# Patient Record
Sex: FEMALE | Race: WHITE | Employment: OTHER | URBAN - METROPOLITAN AREA
[De-identification: names, ages, dates, MRNs, and addresses within clinical notes are randomized per-mention and may not be internally consistent; named-entity substitution may affect disease eponyms.]

---

## 2018-01-01 ENCOUNTER — APPOINTMENT (OUTPATIENT)
Dept: LAB | Facility: HOSPITAL | Age: 73
End: 2018-01-01
Payer: COMMERCIAL

## 2018-01-01 ENCOUNTER — TRANSCRIBE ORDERS (OUTPATIENT)
Dept: ADMINISTRATIVE | Facility: HOSPITAL | Age: 73
End: 2018-01-01

## 2018-01-01 ENCOUNTER — TRANSCRIBE ORDERS (OUTPATIENT)
Dept: LAB | Facility: CLINIC | Age: 73
End: 2018-01-01

## 2018-01-01 DIAGNOSIS — E11.9 DIABETES MELLITUS WITHOUT COMPLICATION (HCC): ICD-10-CM

## 2018-01-01 DIAGNOSIS — E78.5 HYPERLIPIDEMIA, UNSPECIFIED HYPERLIPIDEMIA TYPE: ICD-10-CM

## 2018-01-01 DIAGNOSIS — E11.51 TYPE II DIABETES MELLITUS WITH PERIPHERAL CIRCULATORY DISORDER (HCC): ICD-10-CM

## 2018-01-01 DIAGNOSIS — I10 ESSENTIAL HYPERTENSION, MALIGNANT: Primary | ICD-10-CM

## 2018-01-01 DIAGNOSIS — I10 ESSENTIAL HYPERTENSION, MALIGNANT: ICD-10-CM

## 2018-01-01 LAB
ALBUMIN SERPL BCP-MCNC: 3.6 G/DL (ref 3.5–5)
ALP SERPL-CCNC: 104 U/L (ref 46–116)
ALT SERPL W P-5'-P-CCNC: 30 U/L (ref 12–78)
ANION GAP SERPL CALCULATED.3IONS-SCNC: 10 MMOL/L (ref 4–13)
AST SERPL W P-5'-P-CCNC: 20 U/L (ref 5–45)
BASOPHILS # BLD AUTO: 0.04 THOUSANDS/ΜL (ref 0–0.1)
BASOPHILS NFR BLD AUTO: 1 % (ref 0–1)
BILIRUB SERPL-MCNC: 0.6 MG/DL (ref 0.2–1)
BILIRUB UR QL STRIP: NEGATIVE
BUN SERPL-MCNC: 19 MG/DL (ref 5–25)
CALCIUM SERPL-MCNC: 8.7 MG/DL (ref 8.3–10.1)
CHLORIDE SERPL-SCNC: 104 MMOL/L (ref 100–108)
CHOLEST SERPL-MCNC: 117 MG/DL (ref 50–200)
CLARITY UR: CLEAR
CO2 SERPL-SCNC: 27 MMOL/L (ref 21–32)
COLOR UR: COLORLESS
CREAT SERPL-MCNC: 1.06 MG/DL (ref 0.6–1.3)
CREAT UR-MCNC: <13 MG/DL
EOSINOPHIL # BLD AUTO: 0.47 THOUSAND/ΜL (ref 0–0.61)
EOSINOPHIL NFR BLD AUTO: 6 % (ref 0–6)
ERYTHROCYTE [DISTWIDTH] IN BLOOD BY AUTOMATED COUNT: 13.3 % (ref 11.6–15.1)
EST. AVERAGE GLUCOSE BLD GHB EST-MCNC: 192 MG/DL
GFR SERPL CREATININE-BSD FRML MDRD: 52 ML/MIN/1.73SQ M
GLUCOSE P FAST SERPL-MCNC: 176 MG/DL (ref 65–99)
GLUCOSE UR STRIP-MCNC: NEGATIVE MG/DL
HBA1C MFR BLD: 8.3 % (ref 4.2–6.3)
HCT VFR BLD AUTO: 38.6 % (ref 34.8–46.1)
HDLC SERPL-MCNC: 44 MG/DL (ref 40–60)
HGB BLD-MCNC: 12.7 G/DL (ref 11.5–15.4)
HGB UR QL STRIP.AUTO: NEGATIVE
IMM GRANULOCYTES # BLD AUTO: 0.01 THOUSAND/UL (ref 0–0.2)
IMM GRANULOCYTES NFR BLD AUTO: 0 % (ref 0–2)
KETONES UR STRIP-MCNC: NEGATIVE MG/DL
LDLC SERPL CALC-MCNC: 45 MG/DL (ref 0–100)
LEUKOCYTE ESTERASE UR QL STRIP: NEGATIVE
LYMPHOCYTES # BLD AUTO: 1.74 THOUSANDS/ΜL (ref 0.6–4.47)
LYMPHOCYTES NFR BLD AUTO: 23 % (ref 14–44)
MCH RBC QN AUTO: 30 PG (ref 26.8–34.3)
MCHC RBC AUTO-ENTMCNC: 32.9 G/DL (ref 31.4–37.4)
MCV RBC AUTO: 91 FL (ref 82–98)
MICROALBUMIN UR-MCNC: <5 MG/L (ref 0–20)
MONOCYTES # BLD AUTO: 0.62 THOUSAND/ΜL (ref 0.17–1.22)
MONOCYTES NFR BLD AUTO: 8 % (ref 4–12)
NEUTROPHILS # BLD AUTO: 4.56 THOUSANDS/ΜL (ref 1.85–7.62)
NEUTS SEG NFR BLD AUTO: 62 % (ref 43–75)
NITRITE UR QL STRIP: NEGATIVE
NONHDLC SERPL-MCNC: 73 MG/DL
NRBC BLD AUTO-RTO: 0 /100 WBCS
PH UR STRIP.AUTO: 6.5 [PH] (ref 5–9)
PLATELET # BLD AUTO: 227 THOUSANDS/UL (ref 149–390)
PMV BLD AUTO: 11.4 FL (ref 8.9–12.7)
POTASSIUM SERPL-SCNC: 4.1 MMOL/L (ref 3.5–5.3)
PROT SERPL-MCNC: 6.9 G/DL (ref 6.4–8.2)
PROT UR STRIP-MCNC: NEGATIVE MG/DL
RBC # BLD AUTO: 4.24 MILLION/UL (ref 3.81–5.12)
SODIUM SERPL-SCNC: 141 MMOL/L (ref 136–145)
SP GR UR STRIP.AUTO: <=1.005 (ref 1–1.03)
TRIGL SERPL-MCNC: 138 MG/DL
TSH SERPL DL<=0.05 MIU/L-ACNC: 1.29 UIU/ML (ref 0.36–3.74)
UROBILINOGEN UR QL STRIP.AUTO: 0.2 E.U./DL
WBC # BLD AUTO: 7.44 THOUSAND/UL (ref 4.31–10.16)

## 2018-01-01 PROCEDURE — 82043 UR ALBUMIN QUANTITATIVE: CPT | Performed by: INTERNAL MEDICINE

## 2018-01-01 PROCEDURE — 83036 HEMOGLOBIN GLYCOSYLATED A1C: CPT | Performed by: INTERNAL MEDICINE

## 2018-01-01 PROCEDURE — 85025 COMPLETE CBC W/AUTO DIFF WBC: CPT | Performed by: INTERNAL MEDICINE

## 2018-01-01 PROCEDURE — 80061 LIPID PANEL: CPT | Performed by: INTERNAL MEDICINE

## 2018-01-01 PROCEDURE — 82570 ASSAY OF URINE CREATININE: CPT | Performed by: INTERNAL MEDICINE

## 2018-01-01 PROCEDURE — 36415 COLL VENOUS BLD VENIPUNCTURE: CPT | Performed by: INTERNAL MEDICINE

## 2018-01-01 PROCEDURE — 84443 ASSAY THYROID STIM HORMONE: CPT

## 2018-01-01 PROCEDURE — 81003 URINALYSIS AUTO W/O SCOPE: CPT

## 2018-01-01 PROCEDURE — 80053 COMPREHEN METABOLIC PANEL: CPT | Performed by: INTERNAL MEDICINE

## 2019-01-01 ENCOUNTER — HOSPITAL ENCOUNTER (INPATIENT)
Facility: HOSPITAL | Age: 74
LOS: 1 days | DRG: 302 | End: 2019-07-18
Attending: EMERGENCY MEDICINE | Admitting: ANESTHESIOLOGY
Payer: COMMERCIAL

## 2019-01-01 ENCOUNTER — APPOINTMENT (EMERGENCY)
Dept: RADIOLOGY | Facility: HOSPITAL | Age: 74
DRG: 302 | End: 2019-01-01
Payer: COMMERCIAL

## 2019-01-01 ENCOUNTER — APPOINTMENT (INPATIENT)
Dept: NON INVASIVE DIAGNOSTICS | Facility: HOSPITAL | Age: 74
DRG: 302 | End: 2019-01-01
Payer: COMMERCIAL

## 2019-01-01 ENCOUNTER — TRANSCRIBE ORDERS (OUTPATIENT)
Dept: ADMINISTRATIVE | Facility: HOSPITAL | Age: 74
End: 2019-01-01

## 2019-01-01 ENCOUNTER — APPOINTMENT (EMERGENCY)
Dept: RADIOLOGY | Facility: HOSPITAL | Age: 74
End: 2019-01-01
Payer: COMMERCIAL

## 2019-01-01 ENCOUNTER — HOSPITAL ENCOUNTER (EMERGENCY)
Facility: HOSPITAL | Age: 74
Discharge: HOME | End: 2019-01-02
Attending: EMERGENCY MEDICINE | Admitting: EMERGENCY MEDICINE
Payer: COMMERCIAL

## 2019-01-01 ENCOUNTER — APPOINTMENT (INPATIENT)
Dept: RADIOLOGY | Facility: HOSPITAL | Age: 74
DRG: 302 | End: 2019-01-01
Payer: COMMERCIAL

## 2019-01-01 VITALS
BODY MASS INDEX: 35.17 KG/M2 | TEMPERATURE: 93.4 F | HEART RATE: 40 BPM | OXYGEN SATURATION: 100 % | DIASTOLIC BLOOD PRESSURE: 58 MMHG | HEIGHT: 62 IN | WEIGHT: 191.14 LBS | RESPIRATION RATE: 3 BRPM | SYSTOLIC BLOOD PRESSURE: 112 MMHG

## 2019-01-01 VITALS
DIASTOLIC BLOOD PRESSURE: 57 MMHG | RESPIRATION RATE: 20 BRPM | HEART RATE: 66 BPM | OXYGEN SATURATION: 96 % | SYSTOLIC BLOOD PRESSURE: 104 MMHG | TEMPERATURE: 99.4 F

## 2019-01-01 DIAGNOSIS — I46.9 PEA (PULSELESS ELECTRICAL ACTIVITY) (HCC): ICD-10-CM

## 2019-01-01 DIAGNOSIS — I50.9 CONGESTIVE HEART FAILURE (CHF) (HCC): ICD-10-CM

## 2019-01-01 DIAGNOSIS — S93.409A ANKLE SPRAIN: Primary | ICD-10-CM

## 2019-01-01 DIAGNOSIS — I46.9 CARDIAC ARREST (HCC): Primary | ICD-10-CM

## 2019-01-01 LAB
ABO GROUP BLD: NORMAL
ALBUMIN SERPL BCP-MCNC: 2.2 G/DL (ref 3.5–5)
ALBUMIN SERPL BCP-MCNC: 2.2 G/DL (ref 3.5–5)
ALP SERPL-CCNC: 180 U/L (ref 46–116)
ALP SERPL-CCNC: 180 U/L (ref 46–116)
ALT SERPL W P-5'-P-CCNC: 174 U/L (ref 12–78)
ALT SERPL W P-5'-P-CCNC: 181 U/L (ref 12–78)
ANION GAP SERPL CALCULATED.3IONS-SCNC: 15 MMOL/L (ref 4–13)
ANION GAP SERPL CALCULATED.3IONS-SCNC: 15 MMOL/L (ref 4–13)
ANION GAP SERPL CALCULATED.3IONS-SCNC: 17 MMOL/L (ref 4–13)
APTT PPP: 27 SECONDS (ref 23–37)
ARTERIAL PATENCY WRIST A: YES
AST SERPL W P-5'-P-CCNC: 231 U/L (ref 5–45)
AST SERPL W P-5'-P-CCNC: 333 U/L (ref 5–45)
ATRIAL RATE: 111 BPM
ATRIAL RATE: 179 BPM
BACTERIA UR QL AUTO: ABNORMAL /HPF
BASE EXCESS BLDA CALC-SCNC: -24.1 MMOL/L
BASE EXCESS BLDA CALC-SCNC: -4.1 MMOL/L
BASE EXCESS BLDA CALC-SCNC: -4.6 MMOL/L
BASE EXCESS BLDA CALC-SCNC: -6 MMOL/L (ref -2–3)
BASE EXCESS BLDA CALC-SCNC: -7.1 MMOL/L
BASOPHILS # BLD AUTO: 0.05 THOUSANDS/ΜL (ref 0–0.1)
BASOPHILS # BLD AUTO: 0.06 THOUSANDS/ΜL (ref 0–0.1)
BASOPHILS # BLD MANUAL: 0 THOUSAND/UL (ref 0–0.1)
BASOPHILS NFR BLD AUTO: 0 % (ref 0–1)
BASOPHILS NFR BLD AUTO: 0 % (ref 0–1)
BASOPHILS NFR MAR MANUAL: 0 % (ref 0–1)
BETA-HYDROXYBUTYRATE: 0.2 MMOL/L
BILIRUB SERPL-MCNC: 0.2 MG/DL (ref 0.2–1)
BILIRUB SERPL-MCNC: 0.5 MG/DL (ref 0.2–1)
BILIRUB UR QL STRIP: NEGATIVE
BLD GP AB SCN SERPL QL: NEGATIVE
BODY TEMPERATURE: 94 DEGREES FEHRENHEIT
BODY TEMPERATURE: 97.6 DEGREES FEHRENHEIT
BODY TEMPERATURE: 97.6 DEGREES FEHRENHEIT
BODY TEMPERATURE: 99 DEGREES FEHRENHEIT
BUN SERPL-MCNC: 17 MG/DL (ref 5–25)
BUN SERPL-MCNC: 21 MG/DL (ref 5–25)
BUN SERPL-MCNC: 22 MG/DL (ref 5–25)
CA-I BLD-SCNC: 1.06 MMOL/L (ref 1.12–1.32)
CA-I BLD-SCNC: 1.46 MMOL/L (ref 1.12–1.32)
CALCIUM SERPL-MCNC: 7.9 MG/DL (ref 8.3–10.1)
CALCIUM SERPL-MCNC: 8 MG/DL (ref 8.3–10.1)
CALCIUM SERPL-MCNC: 9.4 MG/DL (ref 8.3–10.1)
CHLORIDE SERPL-SCNC: 104 MMOL/L (ref 100–108)
CHLORIDE SERPL-SCNC: 106 MMOL/L (ref 100–108)
CHLORIDE SERPL-SCNC: 109 MMOL/L (ref 100–108)
CLARITY UR: CLEAR
CO2 SERPL-SCNC: 18 MMOL/L (ref 21–32)
CO2 SERPL-SCNC: 22 MMOL/L (ref 21–32)
CO2 SERPL-SCNC: 24 MMOL/L (ref 21–32)
COLOR UR: YELLOW
CREAT SERPL-MCNC: 1.56 MG/DL (ref 0.6–1.3)
CREAT SERPL-MCNC: 1.57 MG/DL (ref 0.6–1.3)
CREAT SERPL-MCNC: 1.58 MG/DL (ref 0.6–1.3)
DS:DELIVERY SYSTEM: AC
EOSINOPHIL # BLD AUTO: 0.1 THOUSAND/ΜL (ref 0–0.61)
EOSINOPHIL # BLD AUTO: 0.11 THOUSAND/ΜL (ref 0–0.61)
EOSINOPHIL # BLD MANUAL: 2.03 THOUSAND/UL (ref 0–0.4)
EOSINOPHIL NFR BLD AUTO: 1 % (ref 0–6)
EOSINOPHIL NFR BLD AUTO: 1 % (ref 0–6)
EOSINOPHIL NFR BLD MANUAL: 7 % (ref 0–6)
ERYTHROCYTE [DISTWIDTH] IN BLOOD BY AUTOMATED COUNT: 13.4 % (ref 11.6–15.1)
ERYTHROCYTE [DISTWIDTH] IN BLOOD BY AUTOMATED COUNT: 13.5 % (ref 11.6–15.1)
ERYTHROCYTE [DISTWIDTH] IN BLOOD BY AUTOMATED COUNT: 13.7 % (ref 11.6–15.1)
FINE GRAN CASTS URNS QL MICRO: ABNORMAL /LPF
FIO2 GAS DIL.REBREATH: 100 L
GFR SERPL CREATININE-BSD FRML MDRD: 32 ML/MIN/1.73SQ M
GFR SERPL CREATININE-BSD FRML MDRD: 32 ML/MIN/1.73SQ M
GFR SERPL CREATININE-BSD FRML MDRD: 33 ML/MIN/1.73SQ M
GLUCOSE SERPL-MCNC: 275 MG/DL (ref 65–140)
GLUCOSE SERPL-MCNC: 288 MG/DL (ref 65–140)
GLUCOSE SERPL-MCNC: 304 MG/DL (ref 65–140)
GLUCOSE SERPL-MCNC: 403 MG/DL (ref 65–140)
GLUCOSE SERPL-MCNC: 407 MG/DL (ref 65–140)
GLUCOSE SERPL-MCNC: 413 MG/DL (ref 65–140)
GLUCOSE SERPL-MCNC: 419 MG/DL (ref 65–140)
GLUCOSE SERPL-MCNC: 455 MG/DL (ref 65–140)
GLUCOSE SERPL-MCNC: 506 MG/DL (ref 65–140)
GLUCOSE UR STRIP-MCNC: ABNORMAL MG/DL
HCO3 BLDA-SCNC: 10.7 MMOL/L (ref 22–28)
HCO3 BLDA-SCNC: 19.9 MMOL/L (ref 22–28)
HCO3 BLDA-SCNC: 20.5 MMOL/L (ref 22–28)
HCO3 BLDA-SCNC: 21.7 MMOL/L (ref 22–28)
HCO3 BLDA-SCNC: 25.7 MMOL/L (ref 22–28)
HCT VFR BLD AUTO: 37 % (ref 34.8–46.1)
HCT VFR BLD AUTO: 42.5 % (ref 34.8–46.1)
HCT VFR BLD AUTO: 45.2 % (ref 34.8–46.1)
HCT VFR BLD CALC: 40 % (ref 34.8–46.1)
HGB BLD-MCNC: 11.1 G/DL (ref 11.5–15.4)
HGB BLD-MCNC: 13.9 G/DL (ref 11.5–15.4)
HGB BLD-MCNC: 15 G/DL (ref 11.5–15.4)
HGB BLDA-MCNC: 13.6 G/DL (ref 11.5–15.4)
HGB UR QL STRIP.AUTO: ABNORMAL
HOROWITZ INDEX BLDA+IHG-RTO: 100 MM[HG]
IMM GRANULOCYTES # BLD AUTO: 0.26 THOUSAND/UL (ref 0–0.2)
IMM GRANULOCYTES # BLD AUTO: 0.36 THOUSAND/UL (ref 0–0.2)
IMM GRANULOCYTES NFR BLD AUTO: 1 % (ref 0–2)
IMM GRANULOCYTES NFR BLD AUTO: 2 % (ref 0–2)
INR PPP: 1.22 (ref 0.86–1.16)
KETONES UR STRIP-MCNC: NEGATIVE MG/DL
LACTATE SERPL-SCNC: 12.9 MMOL/L (ref 0.5–2)
LACTATE SERPL-SCNC: 5.6 MMOL/L (ref 0.5–2)
LACTATE SERPL-SCNC: 6.3 MMOL/L (ref 0.5–2)
LACTATE SERPL-SCNC: 8.7 MMOL/L (ref 0.5–2)
LEUKOCYTE ESTERASE UR QL STRIP: ABNORMAL
LYMPHOCYTES # BLD AUTO: 11.28 THOUSAND/UL (ref 0.6–4.47)
LYMPHOCYTES # BLD AUTO: 2.71 THOUSANDS/ΜL (ref 0.6–4.47)
LYMPHOCYTES # BLD AUTO: 39 % (ref 14–44)
LYMPHOCYTES # BLD AUTO: 4.09 THOUSANDS/ΜL (ref 0.6–4.47)
LYMPHOCYTES NFR BLD AUTO: 14 % (ref 14–44)
LYMPHOCYTES NFR BLD AUTO: 18 % (ref 14–44)
MACROCYTES BLD QL AUTO: PRESENT
MAGNESIUM SERPL-MCNC: 1.7 MG/DL (ref 1.6–2.6)
MAGNESIUM SERPL-MCNC: 2 MG/DL (ref 1.6–2.6)
MCH RBC QN AUTO: 30 PG (ref 26.8–34.3)
MCH RBC QN AUTO: 30.1 PG (ref 26.8–34.3)
MCH RBC QN AUTO: 30.2 PG (ref 26.8–34.3)
MCHC RBC AUTO-ENTMCNC: 30 G/DL (ref 31.4–37.4)
MCHC RBC AUTO-ENTMCNC: 32.7 G/DL (ref 31.4–37.4)
MCHC RBC AUTO-ENTMCNC: 33.2 G/DL (ref 31.4–37.4)
MCV RBC AUTO: 100 FL (ref 82–98)
MCV RBC AUTO: 91 FL (ref 82–98)
MCV RBC AUTO: 92 FL (ref 82–98)
METAMYELOCYTES NFR BLD MANUAL: 1 % (ref 0–1)
MONOCYTES # BLD AUTO: 0.74 THOUSAND/ΜL (ref 0.17–1.22)
MONOCYTES # BLD AUTO: 0.78 THOUSAND/ΜL (ref 0.17–1.22)
MONOCYTES # BLD AUTO: 0.87 THOUSAND/UL (ref 0–1.22)
MONOCYTES NFR BLD AUTO: 3 % (ref 4–12)
MONOCYTES NFR BLD AUTO: 4 % (ref 4–12)
MONOCYTES NFR BLD: 3 % (ref 4–12)
MYELOCYTES NFR BLD MANUAL: 2 % (ref 0–1)
NEUTROPHILS # BLD AUTO: 15.38 THOUSANDS/ΜL (ref 1.85–7.62)
NEUTROPHILS # BLD AUTO: 17.02 THOUSANDS/ΜL (ref 1.85–7.62)
NEUTROPHILS # BLD MANUAL: 13.89 THOUSAND/UL (ref 1.85–7.62)
NEUTS BAND NFR BLD MANUAL: 15 % (ref 0–8)
NEUTS SEG NFR BLD AUTO: 33 % (ref 43–75)
NEUTS SEG NFR BLD AUTO: 77 % (ref 43–75)
NEUTS SEG NFR BLD AUTO: 79 % (ref 43–75)
NITRITE UR QL STRIP: NEGATIVE
NON-SQ EPI CELLS URNS QL MICRO: ABNORMAL /HPF
NRBC BLD AUTO-RTO: 0 /100 WBCS
NT-PROBNP SERPL-MCNC: 1578 PG/ML
O2 CT BLDA-SCNC: 18 ML/DL (ref 16–23)
O2 CT BLDA-SCNC: 18.2 ML/DL (ref 16–23)
O2 CT BLDA-SCNC: 21.8 ML/DL (ref 16–23)
O2 CT BLDA-SCNC: 22.3 ML/DL (ref 16–23)
OXYHGB MFR BLDA: 88.8 % (ref 94–97)
OXYHGB MFR BLDA: 95.7 % (ref 94–97)
OXYHGB MFR BLDA: 96.3 % (ref 94–97)
OXYHGB MFR BLDA: 98.8 % (ref 94–97)
P AXIS: 105 DEGREES
P AXIS: 64 DEGREES
PCO2 BLD: 23 MMOL/L (ref 21–32)
PCO2 BLD: 50.6 MM HG (ref 36–44)
PCO2 BLD: 54 MM HG
PCO2 BLDA: 35.7 MM HG (ref 36–44)
PCO2 BLDA: 45.5 MM HG
PCO2 BLDA: 49.1 MM HG (ref 36–44)
PCO2 BLDA: 67.1 MM HG (ref 36–44)
PCO2 BLDA: 68.3 MM HG (ref 36–44)
PCO2 TEMP ADJ BLDA: 36 MM HG (ref 36–44)
PCO2 TEMP ADJ BLDA: 43.8 MM HG (ref 36–44)
PCO2 TEMP ADJ BLDA: 65.4 MM HG (ref 36–44)
PCO2 TEMP ADJ BLDA: 66.5 MM HG (ref 36–44)
PEEP RESPIRATORY: 5 CM[H2O]
PEEP RESPIRATORY: 8 CM[H2O]
PH BLD: 6.82 [PH] (ref 7.35–7.45)
PH BLD: 7.21 [PH] (ref 7.35–7.45)
PH BLD: 7.24 [PH] (ref 7.35–7.45)
PH BLD: 7.27 [PH]
PH BLD: 7.27 [PH] (ref 7.35–7.45)
PH BLD: 7.36 [PH] (ref 7.35–7.45)
PH BLDA: 6.81 [PH] (ref 7.35–7.45)
PH BLDA: 7.2 [PH] (ref 7.35–7.45)
PH BLDA: 7.24 [PH] (ref 7.35–7.45)
PH BLDA: 7.37 [PH] (ref 7.35–7.45)
PH UR STRIP.AUTO: 7.5 [PH]
PHOSPHATE SERPL-MCNC: 2.7 MG/DL (ref 2.3–4.1)
PLATELET # BLD AUTO: 203 THOUSANDS/UL (ref 149–390)
PLATELET # BLD AUTO: 231 THOUSANDS/UL (ref 149–390)
PLATELET # BLD AUTO: 285 THOUSANDS/UL (ref 149–390)
PLATELET BLD QL SMEAR: ADEQUATE
PMV BLD AUTO: 10.8 FL (ref 8.9–12.7)
PMV BLD AUTO: 11.2 FL (ref 8.9–12.7)
PMV BLD AUTO: 11.3 FL (ref 8.9–12.7)
PO2 BLD: 141.3 MM HG (ref 75–129)
PO2 BLD: 233.8 MM HG (ref 75–129)
PO2 BLD: 52.6 MM HG (ref 75–129)
PO2 BLD: 63 MM HG (ref 75–129)
PO2 BLD: 91 MM HG (ref 75–129)
PO2 BLDA: 144.9 MM HG (ref 75–129)
PO2 BLDA: 232.8 MM HG (ref 75–129)
PO2 BLDA: 62.9 MM HG (ref 75–129)
PO2 BLDA: 94.4 MM HG (ref 75–129)
POTASSIUM BLD-SCNC: 2.8 MMOL/L (ref 3.5–5.3)
POTASSIUM SERPL-SCNC: 2.9 MMOL/L (ref 3.5–5.3)
POTASSIUM SERPL-SCNC: 4.2 MMOL/L (ref 3.5–5.3)
POTASSIUM SERPL-SCNC: 4.2 MMOL/L (ref 3.5–5.3)
PR INTERVAL: 180 MS
PROCALCITONIN SERPL-MCNC: 3.16 NG/ML
PROT SERPL-MCNC: 5 G/DL (ref 6.4–8.2)
PROT SERPL-MCNC: 5.2 G/DL (ref 6.4–8.2)
PROT UR STRIP-MCNC: ABNORMAL MG/DL
PROTHROMBIN TIME: 12.7 SECONDS (ref 9.4–11.7)
QRS AXIS: -35 DEGREES
QRS AXIS: -65 DEGREES
QRSD INTERVAL: 124 MS
QRSD INTERVAL: 146 MS
QT INTERVAL: 312 MS
QT INTERVAL: 354 MS
QTC INTERVAL: 481 MS
QTC INTERVAL: 513 MS
RBC # BLD AUTO: 3.7 MILLION/UL (ref 3.81–5.12)
RBC # BLD AUTO: 4.6 MILLION/UL (ref 3.81–5.12)
RBC # BLD AUTO: 4.99 MILLION/UL (ref 3.81–5.12)
RBC #/AREA URNS AUTO: ABNORMAL /HPF
RBC MORPH BLD: PRESENT
RH BLD: NEGATIVE
SAO2 % BLD FROM PO2: 87 % (ref 95–98)
SODIUM BLD-SCNC: 147 MMOL/L (ref 136–145)
SODIUM SERPL-SCNC: 141 MMOL/L (ref 136–145)
SODIUM SERPL-SCNC: 141 MMOL/L (ref 136–145)
SODIUM SERPL-SCNC: 148 MMOL/L (ref 136–145)
SP GR UR STRIP.AUTO: 1.01 (ref 1–1.03)
SPECIMEN EXPIRATION DATE: NORMAL
SPECIMEN SOURCE: ABNORMAL
T WAVE AXIS: 148 DEGREES
T WAVE AXIS: 78 DEGREES
TOTAL CELLS COUNTED SPEC: 100
TROPONIN I SERPL-MCNC: 0.08 NG/ML
TROPONIN I SERPL-MCNC: 2.53 NG/ML
TROPONIN I SERPL-MCNC: 3.56 NG/ML
TROPONIN I SERPL-MCNC: 3.91 NG/ML
UROBILINOGEN UR QL STRIP.AUTO: 0.2 E.U./DL
VENT AC: 16
VENT AC: 24
VENT AC: 28
VENT AC: 28
VENT- AC: AC
VENTRICULAR RATE: 111 BPM
VENTRICULAR RATE: 163 BPM
VT SETTING VENT: 450 ML
WBC # BLD AUTO: 19.39 THOUSAND/UL (ref 4.31–10.16)
WBC # BLD AUTO: 22.27 THOUSAND/UL (ref 4.31–10.16)
WBC # BLD AUTO: 28.93 THOUSAND/UL (ref 4.31–10.16)
WBC #/AREA URNS AUTO: ABNORMAL /HPF

## 2019-01-01 PROCEDURE — 83605 ASSAY OF LACTIC ACID: CPT | Performed by: NURSE PRACTITIONER

## 2019-01-01 PROCEDURE — 84145 PROCALCITONIN (PCT): CPT | Performed by: NURSE PRACTITIONER

## 2019-01-01 PROCEDURE — 83605 ASSAY OF LACTIC ACID: CPT | Performed by: ANESTHESIOLOGY

## 2019-01-01 PROCEDURE — 83605 ASSAY OF LACTIC ACID: CPT | Performed by: STUDENT IN AN ORGANIZED HEALTH CARE EDUCATION/TRAINING PROGRAM

## 2019-01-01 PROCEDURE — 85025 COMPLETE CBC W/AUTO DIFF WBC: CPT | Performed by: NURSE PRACTITIONER

## 2019-01-01 PROCEDURE — 82803 BLOOD GASES ANY COMBINATION: CPT

## 2019-01-01 PROCEDURE — 71045 X-RAY EXAM CHEST 1 VIEW: CPT

## 2019-01-01 PROCEDURE — 94640 AIRWAY INHALATION TREATMENT: CPT

## 2019-01-01 PROCEDURE — 4A133J1 MONITORING OF ARTERIAL PULSE, PERIPHERAL, PERCUTANEOUS APPROACH: ICD-10-PCS | Performed by: ANESTHESIOLOGY

## 2019-01-01 PROCEDURE — 82947 ASSAY GLUCOSE BLOOD QUANT: CPT

## 2019-01-01 PROCEDURE — 99292 CRITICAL CARE ADDL 30 MIN: CPT | Performed by: ANESTHESIOLOGY

## 2019-01-01 PROCEDURE — 5A12012 PERFORMANCE OF CARDIAC OUTPUT, SINGLE, MANUAL: ICD-10-PCS | Performed by: EMERGENCY MEDICINE

## 2019-01-01 PROCEDURE — 70450 CT HEAD/BRAIN W/O DYE: CPT

## 2019-01-01 PROCEDURE — 84295 ASSAY OF SERUM SODIUM: CPT

## 2019-01-01 PROCEDURE — 83735 ASSAY OF MAGNESIUM: CPT | Performed by: ANESTHESIOLOGY

## 2019-01-01 PROCEDURE — 83735 ASSAY OF MAGNESIUM: CPT | Performed by: NURSE PRACTITIONER

## 2019-01-01 PROCEDURE — 84132 ASSAY OF SERUM POTASSIUM: CPT

## 2019-01-01 PROCEDURE — 87081 CULTURE SCREEN ONLY: CPT | Performed by: ANESTHESIOLOGY

## 2019-01-01 PROCEDURE — 92950 HEART/LUNG RESUSCITATION CPR: CPT

## 2019-01-01 PROCEDURE — 82948 REAGENT STRIP/BLOOD GLUCOSE: CPT

## 2019-01-01 PROCEDURE — 83519 RIA NONANTIBODY: CPT | Performed by: NURSE PRACTITIONER

## 2019-01-01 PROCEDURE — 80053 COMPREHEN METABOLIC PANEL: CPT | Performed by: STUDENT IN AN ORGANIZED HEALTH CARE EDUCATION/TRAINING PROGRAM

## 2019-01-01 PROCEDURE — 80053 COMPREHEN METABOLIC PANEL: CPT | Performed by: EMERGENCY MEDICINE

## 2019-01-01 PROCEDURE — 84484 ASSAY OF TROPONIN QUANT: CPT | Performed by: NURSE PRACTITIONER

## 2019-01-01 PROCEDURE — 96374 THER/PROPH/DIAG INJ IV PUSH: CPT

## 2019-01-01 PROCEDURE — 93005 ELECTROCARDIOGRAM TRACING: CPT

## 2019-01-01 PROCEDURE — 85610 PROTHROMBIN TIME: CPT | Performed by: NURSE PRACTITIONER

## 2019-01-01 PROCEDURE — 99291 CRITICAL CARE FIRST HOUR: CPT | Performed by: ANESTHESIOLOGY

## 2019-01-01 PROCEDURE — 4A133B1 MONITORING OF ARTERIAL PRESSURE, PERIPHERAL, PERCUTANEOUS APPROACH: ICD-10-PCS | Performed by: ANESTHESIOLOGY

## 2019-01-01 PROCEDURE — 36600 WITHDRAWAL OF ARTERIAL BLOOD: CPT

## 2019-01-01 PROCEDURE — 87040 BLOOD CULTURE FOR BACTERIA: CPT

## 2019-01-01 PROCEDURE — 86901 BLOOD TYPING SEROLOGIC RH(D): CPT | Performed by: EMERGENCY MEDICINE

## 2019-01-01 PROCEDURE — 80048 BASIC METABOLIC PNL TOTAL CA: CPT | Performed by: NURSE PRACTITIONER

## 2019-01-01 PROCEDURE — 82805 BLOOD GASES W/O2 SATURATION: CPT | Performed by: STUDENT IN AN ORGANIZED HEALTH CARE EDUCATION/TRAINING PROGRAM

## 2019-01-01 PROCEDURE — 94760 N-INVAS EAR/PLS OXIMETRY 1: CPT

## 2019-01-01 PROCEDURE — 85027 COMPLETE CBC AUTOMATED: CPT | Performed by: EMERGENCY MEDICINE

## 2019-01-01 PROCEDURE — 94002 VENT MGMT INPAT INIT DAY: CPT

## 2019-01-01 PROCEDURE — 83605 ASSAY OF LACTIC ACID: CPT | Performed by: EMERGENCY MEDICINE

## 2019-01-01 PROCEDURE — 84484 ASSAY OF TROPONIN QUANT: CPT | Performed by: EMERGENCY MEDICINE

## 2019-01-01 PROCEDURE — 82010 KETONE BODYS QUAN: CPT | Performed by: EMERGENCY MEDICINE

## 2019-01-01 PROCEDURE — 85730 THROMBOPLASTIN TIME PARTIAL: CPT | Performed by: NURSE PRACTITIONER

## 2019-01-01 PROCEDURE — 03HY32Z INSERTION OF MONITORING DEVICE INTO UPPER ARTERY, PERCUTANEOUS APPROACH: ICD-10-PCS | Performed by: ANESTHESIOLOGY

## 2019-01-01 PROCEDURE — 81001 URINALYSIS AUTO W/SCOPE: CPT

## 2019-01-01 PROCEDURE — 82805 BLOOD GASES W/O2 SATURATION: CPT | Performed by: NURSE PRACTITIONER

## 2019-01-01 PROCEDURE — 82805 BLOOD GASES W/O2 SATURATION: CPT | Performed by: EMERGENCY MEDICINE

## 2019-01-01 PROCEDURE — 86900 BLOOD TYPING SEROLOGIC ABO: CPT | Performed by: EMERGENCY MEDICINE

## 2019-01-01 PROCEDURE — 83880 ASSAY OF NATRIURETIC PEPTIDE: CPT | Performed by: EMERGENCY MEDICINE

## 2019-01-01 PROCEDURE — 85025 COMPLETE CBC W/AUTO DIFF WBC: CPT | Performed by: STUDENT IN AN ORGANIZED HEALTH CARE EDUCATION/TRAINING PROGRAM

## 2019-01-01 PROCEDURE — 36620 INSERTION CATHETER ARTERY: CPT | Performed by: NURSE PRACTITIONER

## 2019-01-01 PROCEDURE — 5A1935Z RESPIRATORY VENTILATION, LESS THAN 24 CONSECUTIVE HOURS: ICD-10-PCS | Performed by: ANESTHESIOLOGY

## 2019-01-01 PROCEDURE — 36415 COLL VENOUS BLD VENIPUNCTURE: CPT | Performed by: EMERGENCY MEDICINE

## 2019-01-01 PROCEDURE — 85007 BL SMEAR W/DIFF WBC COUNT: CPT | Performed by: EMERGENCY MEDICINE

## 2019-01-01 PROCEDURE — 99283 EMERGENCY DEPT VISIT LOW MDM: CPT

## 2019-01-01 PROCEDURE — 82330 ASSAY OF CALCIUM: CPT | Performed by: ANESTHESIOLOGY

## 2019-01-01 PROCEDURE — 82330 ASSAY OF CALCIUM: CPT

## 2019-01-01 PROCEDURE — 99291 CRITICAL CARE FIRST HOUR: CPT

## 2019-01-01 PROCEDURE — 85014 HEMATOCRIT: CPT

## 2019-01-01 PROCEDURE — 86850 RBC ANTIBODY SCREEN: CPT | Performed by: EMERGENCY MEDICINE

## 2019-01-01 PROCEDURE — 73610 X-RAY EXAM OF ANKLE: CPT

## 2019-01-01 PROCEDURE — 84100 ASSAY OF PHOSPHORUS: CPT | Performed by: ANESTHESIOLOGY

## 2019-01-01 PROCEDURE — NC001 PR NO CHARGE: Performed by: ANESTHESIOLOGY

## 2019-01-01 RX ORDER — CEFEPIME HYDROCHLORIDE 1 G/50ML
1000 INJECTION, SOLUTION INTRAVENOUS EVERY 12 HOURS
Status: DISCONTINUED | OUTPATIENT
Start: 2019-01-01 | End: 2019-01-01

## 2019-01-01 RX ORDER — CARVEDILOL 12.5 MG/1
12.5 TABLET ORAL 2 TIMES DAILY WITH MEALS
COMMUNITY
End: 2019-01-01 | Stop reason: HOSPADM

## 2019-01-01 RX ORDER — ALBUTEROL SULFATE 1.25 MG/3ML
1 SOLUTION RESPIRATORY (INHALATION) EVERY 6 HOURS PRN
COMMUNITY
End: 2019-01-01 | Stop reason: HOSPADM

## 2019-01-01 RX ORDER — SODIUM CHLORIDE, SODIUM GLUCONATE, SODIUM ACETATE, POTASSIUM CHLORIDE, MAGNESIUM CHLORIDE, SODIUM PHOSPHATE, DIBASIC, AND POTASSIUM PHOSPHATE .53; .5; .37; .037; .03; .012; .00082 G/100ML; G/100ML; G/100ML; G/100ML; G/100ML; G/100ML; G/100ML
1000 INJECTION, SOLUTION INTRAVENOUS ONCE
Status: COMPLETED | OUTPATIENT
Start: 2019-01-01 | End: 2019-01-01

## 2019-01-01 RX ORDER — FENTANYL CITRATE 50 UG/ML
50 INJECTION, SOLUTION INTRAMUSCULAR; INTRAVENOUS EVERY 2 HOUR PRN
Status: DISCONTINUED | OUTPATIENT
Start: 2019-01-01 | End: 2019-01-01 | Stop reason: HOSPADM

## 2019-01-01 RX ORDER — ATORVASTATIN CALCIUM 80 MG/1
80 TABLET, FILM COATED ORAL DAILY
COMMUNITY
End: 2019-01-01 | Stop reason: HOSPADM

## 2019-01-01 RX ORDER — MAGNESIUM SULFATE HEPTAHYDRATE 40 MG/ML
2 INJECTION, SOLUTION INTRAVENOUS ONCE
Status: COMPLETED | OUTPATIENT
Start: 2019-01-01 | End: 2019-01-01

## 2019-01-01 RX ORDER — POTASSIUM CHLORIDE 14.9 MG/ML
20 INJECTION INTRAVENOUS ONCE
Status: COMPLETED | OUTPATIENT
Start: 2019-01-01 | End: 2019-01-01

## 2019-01-01 RX ORDER — METOPROLOL TARTRATE 5 MG/5ML
5 INJECTION INTRAVENOUS ONCE
Status: COMPLETED | OUTPATIENT
Start: 2019-01-01 | End: 2019-01-01

## 2019-01-01 RX ORDER — SODIUM CHLORIDE, SODIUM GLUCONATE, SODIUM ACETATE, POTASSIUM CHLORIDE, MAGNESIUM CHLORIDE, SODIUM PHOSPHATE, DIBASIC, AND POTASSIUM PHOSPHATE .53; .5; .37; .037; .03; .012; .00082 G/100ML; G/100ML; G/100ML; G/100ML; G/100ML; G/100ML; G/100ML
75 INJECTION, SOLUTION INTRAVENOUS CONTINUOUS
Status: DISCONTINUED | OUTPATIENT
Start: 2019-01-01 | End: 2019-01-01

## 2019-01-01 RX ORDER — HEPARIN SODIUM 10000 [USP'U]/100ML
3-20 INJECTION, SOLUTION INTRAVENOUS
Status: DISCONTINUED | OUTPATIENT
Start: 2019-01-01 | End: 2019-01-01

## 2019-01-01 RX ORDER — LEVALBUTEROL INHALATION SOLUTION 0.63 MG/3ML
0.63 SOLUTION RESPIRATORY (INHALATION) EVERY 8 HOURS PRN
Status: DISCONTINUED | OUTPATIENT
Start: 2019-01-01 | End: 2019-01-01

## 2019-01-01 RX ORDER — PROPOFOL 10 MG/ML
5-50 INJECTION, EMULSION INTRAVENOUS
Status: DISCONTINUED | OUTPATIENT
Start: 2019-01-01 | End: 2019-01-01

## 2019-01-01 RX ORDER — ASPIRIN 81 MG/1
81 TABLET, CHEWABLE ORAL DAILY
COMMUNITY
End: 2019-01-01 | Stop reason: HOSPADM

## 2019-01-01 RX ORDER — LORAZEPAM 2 MG/ML
1 INJECTION INTRAMUSCULAR ONCE
Status: COMPLETED | OUTPATIENT
Start: 2019-01-01 | End: 2019-01-01

## 2019-01-01 RX ORDER — MORPHINE SULFATE 4 MG/ML
4 INJECTION, SOLUTION INTRAMUSCULAR; INTRAVENOUS ONCE
Status: COMPLETED | OUTPATIENT
Start: 2019-01-01 | End: 2019-01-01

## 2019-01-01 RX ORDER — VANCOMYCIN HYDROCHLORIDE 1 G/200ML
10 INJECTION, SOLUTION INTRAVENOUS EVERY 12 HOURS
Status: DISCONTINUED | OUTPATIENT
Start: 2019-01-01 | End: 2019-01-01 | Stop reason: DRUGHIGH

## 2019-01-01 RX ORDER — ALBUTEROL SULFATE 90 UG/1
2 AEROSOL, METERED RESPIRATORY (INHALATION) EVERY 6 HOURS PRN
COMMUNITY
End: 2019-01-01 | Stop reason: HOSPADM

## 2019-01-01 RX ORDER — MORPHINE SULFATE 100 MG/5ML
5 SOLUTION ORAL
Status: DISCONTINUED | OUTPATIENT
Start: 2019-01-01 | End: 2019-01-01

## 2019-01-01 RX ORDER — SODIUM BICARBONATE 84 MG/ML
INJECTION, SOLUTION INTRAVENOUS CODE/TRAUMA/SEDATION MEDICATION
Status: COMPLETED | OUTPATIENT
Start: 2019-01-01 | End: 2019-01-01

## 2019-01-01 RX ORDER — FLUTICASONE PROPIONATE 50 MCG
1 SPRAY, SUSPENSION (ML) NASAL AS NEEDED
COMMUNITY
End: 2019-01-01 | Stop reason: HOSPADM

## 2019-01-01 RX ORDER — POTASSIUM CHLORIDE 29.8 MG/ML
40 INJECTION INTRAVENOUS ONCE
Status: DISCONTINUED | OUTPATIENT
Start: 2019-01-01 | End: 2019-01-01 | Stop reason: SDUPTHER

## 2019-01-01 RX ORDER — LORAZEPAM 2 MG/ML
1 INJECTION INTRAMUSCULAR
Status: DISCONTINUED | OUTPATIENT
Start: 2019-01-01 | End: 2019-01-01 | Stop reason: HOSPADM

## 2019-01-01 RX ORDER — SODIUM CHLORIDE, SODIUM GLUCONATE, SODIUM ACETATE, POTASSIUM CHLORIDE, MAGNESIUM CHLORIDE, SODIUM PHOSPHATE, DIBASIC, AND POTASSIUM PHOSPHATE .53; .5; .37; .037; .03; .012; .00082 G/100ML; G/100ML; G/100ML; G/100ML; G/100ML; G/100ML; G/100ML
500 INJECTION, SOLUTION INTRAVENOUS ONCE
Status: COMPLETED | OUTPATIENT
Start: 2019-01-01 | End: 2019-01-01

## 2019-01-01 RX ORDER — GLYCOPYRROLATE 0.2 MG/ML
0.2 INJECTION INTRAMUSCULAR; INTRAVENOUS EVERY 4 HOURS PRN
Status: DISCONTINUED | OUTPATIENT
Start: 2019-01-01 | End: 2019-01-01 | Stop reason: HOSPADM

## 2019-01-01 RX ORDER — VANCOMYCIN HYDROCHLORIDE 1 G/200ML
15 INJECTION, SOLUTION INTRAVENOUS EVERY 24 HOURS
Status: DISCONTINUED | OUTPATIENT
Start: 2019-07-19 | End: 2019-01-01

## 2019-01-01 RX ORDER — ATORVASTATIN CALCIUM 80 MG/1
80 TABLET, FILM COATED ORAL DAILY
Status: DISCONTINUED | OUTPATIENT
Start: 2019-01-01 | End: 2019-01-01

## 2019-01-01 RX ORDER — HEPARIN SODIUM 5000 [USP'U]/ML
5000 INJECTION, SOLUTION INTRAVENOUS; SUBCUTANEOUS EVERY 8 HOURS SCHEDULED
Status: DISCONTINUED | OUTPATIENT
Start: 2019-01-01 | End: 2019-01-01 | Stop reason: CLARIF

## 2019-01-01 RX ORDER — LORAZEPAM 2 MG/ML
2 INJECTION INTRAMUSCULAR EVERY 4 HOURS PRN
Status: DISCONTINUED | OUTPATIENT
Start: 2019-01-01 | End: 2019-01-01

## 2019-01-01 RX ORDER — HEPARIN SODIUM 1000 [USP'U]/ML
4000 INJECTION, SOLUTION INTRAVENOUS; SUBCUTANEOUS AS NEEDED
Status: DISCONTINUED | OUTPATIENT
Start: 2019-01-01 | End: 2019-01-01

## 2019-01-01 RX ORDER — MORPHINE SULFATE 4 MG/ML
4 INJECTION, SOLUTION INTRAMUSCULAR; INTRAVENOUS ONCE
Status: DISCONTINUED | OUTPATIENT
Start: 2019-01-01 | End: 2019-01-01

## 2019-01-01 RX ORDER — CALCIUM CHLORIDE 100 MG/ML
SYRINGE (ML) INTRAVENOUS CODE/TRAUMA/SEDATION MEDICATION
Status: COMPLETED | OUTPATIENT
Start: 2019-01-01 | End: 2019-01-01

## 2019-01-01 RX ORDER — HEPARIN SODIUM 1000 [USP'U]/ML
2000 INJECTION, SOLUTION INTRAVENOUS; SUBCUTANEOUS AS NEEDED
Status: DISCONTINUED | OUTPATIENT
Start: 2019-01-01 | End: 2019-01-01

## 2019-01-01 RX ORDER — PROPOFOL 10 MG/ML
50 INJECTION, EMULSION INTRAVENOUS ONCE
Status: COMPLETED | OUTPATIENT
Start: 2019-01-01 | End: 2019-01-01

## 2019-01-01 RX ORDER — ASPIRIN 81 MG/1
81 TABLET, CHEWABLE ORAL DAILY
Status: DISCONTINUED | OUTPATIENT
Start: 2019-01-01 | End: 2019-01-01

## 2019-01-01 RX ORDER — CHLORHEXIDINE GLUCONATE 0.12 MG/ML
15 RINSE ORAL EVERY 12 HOURS SCHEDULED
Status: DISCONTINUED | OUTPATIENT
Start: 2019-01-01 | End: 2019-01-01

## 2019-01-01 RX ADMIN — SODIUM BICARBONATE 50 MEQ: 84 INJECTION, SOLUTION INTRAVENOUS at 08:21

## 2019-01-01 RX ADMIN — HEPARIN SODIUM 5000 UNITS: 5000 INJECTION INTRAVENOUS; SUBCUTANEOUS at 06:16

## 2019-01-01 RX ADMIN — EPINEPHRINE 1 MG: 0.1 INJECTION, SOLUTION ENDOTRACHEAL; INTRACARDIAC; INTRAVENOUS at 08:02

## 2019-01-01 RX ADMIN — SODIUM BICARBONATE 50 MEQ: 84 INJECTION, SOLUTION INTRAVENOUS at 02:42

## 2019-01-01 RX ADMIN — METOPROLOL TARTRATE 5 MG: 5 INJECTION, SOLUTION INTRAVENOUS at 04:37

## 2019-01-01 RX ADMIN — PROPOFOL 10 MCG/KG/MIN: 10 INJECTION, EMULSION INTRAVENOUS at 02:43

## 2019-01-01 RX ADMIN — CEFEPIME HYDROCHLORIDE 2000 MG: 2 INJECTION, SOLUTION INTRAVENOUS at 01:52

## 2019-01-01 RX ADMIN — MORPHINE SULFATE 4 MG: 4 INJECTION INTRAVENOUS at 13:53

## 2019-01-01 RX ADMIN — SODIUM BICARBONATE 50 MEQ: 84 INJECTION, SOLUTION INTRAVENOUS at 01:45

## 2019-01-01 RX ADMIN — PROPOFOL 50 MG: 10 INJECTION, EMULSION INTRAVENOUS at 02:42

## 2019-01-01 RX ADMIN — SODIUM BICARBONATE 50 MEQ: 84 INJECTION, SOLUTION INTRAVENOUS at 10:31

## 2019-01-01 RX ADMIN — FENTANYL CITRATE 50 MCG: 50 INJECTION, SOLUTION INTRAMUSCULAR; INTRAVENOUS at 11:22

## 2019-01-01 RX ADMIN — EPINEPHRINE 1 MG: 0.1 INJECTION, SOLUTION ENDOTRACHEAL; INTRACARDIAC; INTRAVENOUS at 08:00

## 2019-01-01 RX ADMIN — LEVALBUTEROL HYDROCHLORIDE 0.63 MG: 0.63 SOLUTION RESPIRATORY (INHALATION) at 08:14

## 2019-01-01 RX ADMIN — SODIUM CHLORIDE, SODIUM GLUCONATE, SODIUM ACETATE, POTASSIUM CHLORIDE, MAGNESIUM CHLORIDE, SODIUM PHOSPHATE, DIBASIC, AND POTASSIUM PHOSPHATE 75 ML/HR: .53; .5; .37; .037; .03; .012; .00082 INJECTION, SOLUTION INTRAVENOUS at 04:35

## 2019-01-01 RX ADMIN — LORAZEPAM 1 MG: 2 INJECTION INTRAMUSCULAR; INTRAVENOUS at 13:55

## 2019-01-01 RX ADMIN — CALCIUM CHLORIDE 1 G: 100 INJECTION PARENTERAL at 08:04

## 2019-01-01 RX ADMIN — MAGNESIUM SULFATE HEPTAHYDRATE 2 G: 40 INJECTION, SOLUTION INTRAVENOUS at 09:39

## 2019-01-01 RX ADMIN — POTASSIUM CHLORIDE 20 MEQ: 200 INJECTION, SOLUTION INTRAVENOUS at 09:01

## 2019-01-01 RX ADMIN — EPINEPHRINE 22 MCG/MIN: 1 INJECTION, SOLUTION, CONCENTRATE INTRAVENOUS at 12:35

## 2019-01-01 RX ADMIN — SODIUM BICARBONATE 50 MEQ: 84 INJECTION, SOLUTION INTRAVENOUS at 12:30

## 2019-01-01 RX ADMIN — EPINEPHRINE 2 MCG/MIN: 1 INJECTION, SOLUTION, CONCENTRATE INTRAVENOUS at 08:22

## 2019-01-01 RX ADMIN — VANCOMYCIN HYDROCHLORIDE 1500 MG: 1 INJECTION, POWDER, LYOPHILIZED, FOR SOLUTION INTRAVENOUS at 04:26

## 2019-01-01 RX ADMIN — SODIUM BICARBONATE 50 MEQ: 84 INJECTION, SOLUTION INTRAVENOUS at 08:06

## 2019-01-01 RX ADMIN — POTASSIUM CHLORIDE 20 MEQ: 200 INJECTION, SOLUTION INTRAVENOUS at 10:44

## 2019-01-01 RX ADMIN — SODIUM CHLORIDE, SODIUM GLUCONATE, SODIUM ACETATE, POTASSIUM CHLORIDE, MAGNESIUM CHLORIDE, SODIUM PHOSPHATE, DIBASIC, AND POTASSIUM PHOSPHATE 1000 ML: .53; .5; .37; .037; .03; .012; .00082 INJECTION, SOLUTION INTRAVENOUS at 08:25

## 2019-01-01 RX ADMIN — SODIUM CHLORIDE, SODIUM GLUCONATE, SODIUM ACETATE, POTASSIUM CHLORIDE, MAGNESIUM CHLORIDE, SODIUM PHOSPHATE, DIBASIC, AND POTASSIUM PHOSPHATE 500 ML: .53; .5; .37; .037; .03; .012; .00082 INJECTION, SOLUTION INTRAVENOUS at 06:18

## 2019-01-01 RX ADMIN — LORAZEPAM 2 MG: 2 INJECTION INTRAMUSCULAR; INTRAVENOUS at 09:38

## 2019-01-01 RX ADMIN — SODIUM BICARBONATE 50 MEQ: 84 INJECTION, SOLUTION INTRAVENOUS at 01:30

## 2019-01-01 RX ADMIN — Medication 10 UNITS/HR: at 05:28

## 2019-01-01 RX ADMIN — METRONIDAZOLE 500 MG: 500 INJECTION, SOLUTION INTRAVENOUS at 06:17

## 2019-01-01 RX ADMIN — SODIUM BICARBONATE 50 MEQ: 84 INJECTION, SOLUTION INTRAVENOUS at 10:30

## 2019-01-01 RX ADMIN — LORAZEPAM 1 MG: 2 INJECTION INTRAMUSCULAR; INTRAVENOUS at 03:54

## 2019-01-02 NOTE — DISCHARGE INSTRUCTIONS

## 2019-01-03 NOTE — ED PROVIDER NOTES
History  Chief Complaint   Patient presents with    Ankle Injury     turned L ankle a week ago  still swollen and painful     Patient presents for evaluation of left ankle injury  States she rolled it one week ago  States she has been resting it thinking it was sprain but not getting better  History provided by:  Patient   used: No    Ankle Injury       Prior to Admission Medications   Prescriptions Last Dose Informant Patient Reported? Taking? albuterol (ACCUNEB) 1 25 MG/3ML nebulizer solution   Yes Yes   Sig: Take 1 ampule by nebulization every 6 (six) hours as needed for wheezing   albuterol (PROVENTIL HFA,VENTOLIN HFA) 90 mcg/act inhaler   Yes Yes   Sig: Inhale 2 puffs every 6 (six) hours as needed for wheezing   aspirin 81 mg chewable tablet   Yes Yes   Sig: Chew 81 mg daily   atorvastatin (LIPITOR) 80 mg tablet   Yes Yes   Sig: Take 80 mg by mouth daily   carvedilol (COREG) 12 5 mg tablet   Yes Yes   Sig: Take 12 5 mg by mouth 2 (two) times a day with meals   fluticasone (FLONASE) 50 mcg/act nasal spray   Yes Yes   Si spray into each nostril as needed for rhinitis   sacubitril-valsartan (ENTRESTO) 24-26 MG TABS   Yes Yes   Sig: Take 1 tablet by mouth 2 (two) times a day      Facility-Administered Medications: None       Past Medical History:   Diagnosis Date    Cardiac disease        Past Surgical History:   Procedure Laterality Date    CARDIAC PACEMAKER PLACEMENT      CARDIAC SURGERY         History reviewed  No pertinent family history  I have reviewed and agree with the history as documented  Social History   Substance Use Topics    Smoking status: Never Smoker    Smokeless tobacco: Never Used    Alcohol use No        Review of Systems   Musculoskeletal: Positive for arthralgias  All other systems reviewed and are negative  Physical Exam  Physical Exam   Constitutional: She is oriented to person, place, and time  No distress     Cardiovascular: Normal rate, regular rhythm and intact distal pulses  Pulmonary/Chest: Effort normal and breath sounds normal  No respiratory distress  Musculoskeletal: Normal range of motion  She exhibits tenderness  Feet:    Neurological: She is alert and oriented to person, place, and time  Skin: Capillary refill takes less than 2 seconds  She is not diaphoretic  Nursing note and vitals reviewed  Vital Signs  ED Triage Vitals [01/02/19 1620]   Temperature Pulse Respirations Blood Pressure SpO2   99 4 °F (37 4 °C) 66 20 104/57 96 %      Temp Source Heart Rate Source Patient Position - Orthostatic VS BP Location FiO2 (%)   Tympanic Monitor Sitting Right arm --      Pain Score       Worst Possible Pain           Vitals:    01/02/19 1620   BP: 104/57   Pulse: 66   Patient Position - Orthostatic VS: Sitting       Visual Acuity      ED Medications  Medications - No data to display    Diagnostic Studies  Results Reviewed     None                 XR ankle 3+ views LEFT   Final Result by Karine Espinal MD (01/02 1659)      No acute osseous abnormality  Soft tissue swelling overlying the lateral malleolus  If symptoms persist, a repeat left ankle x-ray one week could be performed for further evaluation        Workstation performed: [id number]                    Procedures  Procedures       Phone Contacts  ED Phone Contact    ED Course                               MDM  Number of Diagnoses or Management Options  Ankle sprain:   Diagnosis management comments: Pulse ox 96% on RA indicating adequate oxygenation  Xray L ankle: no fx or dislocation as read by me           Amount and/or Complexity of Data Reviewed  Tests in the radiology section of CPT®: ordered and reviewed  Decide to obtain previous medical records or to obtain history from someone other than the patient: yes  Review and summarize past medical records: yes  Independent visualization of images, tracings, or specimens: yes    Patient Progress  Patient progress: stable    CritCare Time    Disposition  Final diagnoses: Ankle sprain     Time reflects when diagnosis was documented in both MDM as applicable and the Disposition within this note     Time User Action Codes Description Comment    1/2/2019  5:09 PM Miguel Haile 89 Ankle sprain       ED Disposition     ED Disposition Condition Comment    Discharge  Reena Massed discharge to home/self care  Condition at discharge: stable        Follow-up Information     Follow up With Specialties Details Why [address] Apoorva Clifford MD Orthopedic Surgery In 1 week  [address]  [phone]            Discharge Medication List as of 1/2/2019  5:10 PM      CONTINUE these medications which have NOT CHANGED    Details   albuterol (ACCUNEB) 1 25 MG/3ML nebulizer solution Take 1 ampule by nebulization every 6 (six) hours as needed for wheezing, Historical Med      albuterol (PROVENTIL HFA,VENTOLIN HFA) 90 mcg/act inhaler Inhale 2 puffs every 6 (six) hours as needed for wheezing, Historical Med      aspirin 81 mg chewable tablet Chew 81 mg daily, Historical Med      atorvastatin (LIPITOR) 80 mg tablet Take 80 mg by mouth daily, Historical Med      carvedilol (COREG) 12 5 mg tablet Take 12 5 mg by mouth 2 (two) times a day with meals, Historical Med      fluticasone (FLONASE) 50 mcg/act nasal spray 1 spray into each nostril as needed for rhinitis, Historical Med      sacubitril-valsartan (ENTRESTO) 24-26 MG TABS Take 1 tablet by mouth 2 (two) times a day, Historical Med           No discharge procedures on file      ED Provider  Electronically Signed by           Elidia Condon DO  01/02/19 2054

## 2019-07-18 PROBLEM — R65.20 SEVERE SEPSIS (HCC): Status: ACTIVE | Noted: 2019-01-01

## 2019-07-18 PROBLEM — G93.1 HYPOXIC BRAIN INJURY (HCC): Status: ACTIVE | Noted: 2019-01-01

## 2019-07-18 PROBLEM — R77.8 ELEVATED TROPONIN: Status: ACTIVE | Noted: 2019-01-01

## 2019-07-18 PROBLEM — R73.9 HYPERGLYCEMIA: Status: ACTIVE | Noted: 2019-01-01

## 2019-07-18 PROBLEM — I50.20 SYSTOLIC HEART FAILURE (HCC): Status: ACTIVE | Noted: 2019-01-01

## 2019-07-18 PROBLEM — I46.9 PEA (PULSELESS ELECTRICAL ACTIVITY) (HCC): Status: ACTIVE | Noted: 2019-01-01

## 2019-07-18 PROBLEM — J96.01 ACUTE RESPIRATORY FAILURE WITH HYPOXIA AND HYPERCAPNIA (HCC): Status: ACTIVE | Noted: 2019-01-01

## 2019-07-18 PROBLEM — E87.2 LACTIC ACID ACIDOSIS: Status: ACTIVE | Noted: 2019-01-01

## 2019-07-18 PROBLEM — I48.92 ATRIAL FLUTTER (HCC): Status: ACTIVE | Noted: 2019-01-01

## 2019-07-18 PROBLEM — A41.9 SEVERE SEPSIS (HCC): Status: ACTIVE | Noted: 2019-01-01

## 2019-07-18 PROBLEM — E87.2 ACIDOSIS: Status: ACTIVE | Noted: 2019-01-01

## 2019-07-18 PROBLEM — J96.02 ACUTE RESPIRATORY FAILURE WITH HYPOXIA AND HYPERCAPNIA (HCC): Status: ACTIVE | Noted: 2019-01-01

## 2019-07-18 NOTE — ASSESSMENT & PLAN NOTE
· Patient s/p PEA arrest  · Troponin initially 0 08, increased to 2 5  · Likely secondary to arrest  EKG without acute ST changes  · Will obtain Echocardiogram to assess for RWMA  · Will start heparin gtt for ACS

## 2019-07-18 NOTE — ASSESSMENT & PLAN NOTE
· CT head shows evidence of global anoxic brain injury  · NSE sent   · Continue TTM   · Multiple attempts made to reach the family for goals of care discussion, no answer   Voicemail left to return call

## 2019-07-18 NOTE — PLAN OF CARE
Problem: RESPIRATORY - ADULT  Goal: Achieves optimal ventilation and oxygenation  Description  INTERVENTIONS:  - Assess for changes in respiratory status  - Assess for changes in mentation and behavior  - Position to facilitate oxygenation and minimize respiratory effort  - Oxygen administration by appropriate delivery method based on oxygen saturation (per order) or ABGs  - Encourage broncho-pulmonary hygiene including cough, deep breathe, Incentive Spirometry  - Assess the need for suctioning and aspirate as needed  - Assess and instruct to report SOB or any respiratory difficulty  - Respiratory Therapy support as indicate-  -Ventilator support   Outcome: Progressing     Problem: Prexisting or High Potential for Compromised Skin Integrity  Goal: Skin integrity is maintained or improved  Description  INTERVENTIONS:  - Identify patients at risk for skin breakdown  - Assess and monitor skin integrity  - Assess and monitor nutrition and hydration status  - Monitor labs (i e  albumin)  - Assess for incontinence   - Turn and reposition patient  - Assist with mobility/ambulation  - Relieve pressure over bony prominences  - Avoid friction and shearing  - Provide appropriate hygiene as needed including keeping skin clean and dry  - Evaluate need for skin moisturizer/barrier cream  - Collaborate with interdisciplinary team (i e  Nutrition, Rehabilitation, etc )   - Patient/family teaching  Outcome: Progressing     Problem: Potential for Falls  Goal: Patient will remain free of falls  Description  INTERVENTIONS:  - Assess patient frequently for physical needs  -  Identify cognitive and physical deficits and behaviors that affect risk of falls    -  [place] fall precautions as indicated by assessment   - Educate patient/family on patient safety including physical limitations  - Instruct patient to call for assistance with activity based on assessment  - Modify environment to reduce risk of injury  - Consider OT/PT consult to assist with strengthening/mobility  Outcome: Progressing     Problem: NEUROSENSORY - ADULT  Goal: Achieves stable or improved neurological status  Description  INTERVENTIONS  - Monitor and report changes in neurological status  - Initiate measures to prevent increased intracranial pressure  - Maintain blood pressure and fluid volume within ordered parameters to optimize cerebral perfusion  - Monitor temperature, glucose, and sodium or any other associated labs   Initiate appropriate interventions as ordered  - Monitor for seizure activity   - Administer anti-seizure medications as ordered  Outcome: Progressing  Goal: Absence of seizures  Description  INTERVENTIONS  - Monitor for seizure activity  - Administer anti-seizure medications as ordered  - Monitor neurological status  Outcome: Progressing  Goal: Remains free of injury related to seizures activity  Description  INTERVENTIONS  - Maintain airway, patient safety  and administer oxygen as ordered  - Monitor patient for seizure activity, document and report duration and description of seizure to physician/advanced practitioner  - If seizure occurs,  ensure patient safety during seizure  - Reorient patient post seizure  - Seizure pads on all 4 side rails  - Instruct patient/family to notify RN of any seizure activity including if an aura is experienced  - Instruct patient/family to call for assistance with activity based on nursing assessment  - Administer anti-seizure medications as ordered  - Monitor fetal well being  Outcome: Progressing     Problem: CARDIOVASCULAR - ADULT  Goal: Maintains optimal cardiac output and hemodynamic stability  Description  INTERVENTIONS:  - Monitor I/O, vital signs and rhythm  - Monitor for S/S and trends of decreased cardiac output i e  bleeding, hypotension  - Administer and titrate ordered vasoactive medications to optimize hemodynamic stability  - Assess quality of pulses, skin color and temperature  - Assess for signs of decreased coronary artery perfusion - ex   Angina  - Instruct patient to report change in severity of symptoms  Outcome: Progressing  Goal: Absence of cardiac dysrhythmias or at baseline rhythm  Description  INTERVENTIONS:  - Continuous cardiac monitoring, monitor vital signs, obtain 12 lead EKG if indicated  - Administer antiarrhythmic and heart rate control medications as ordered  - Monitor electrolytes and administer replacement therapy as ordered  Outcome: Progressing     Problem: GENITOURINARY - ADULT  Goal: Maintains or returns to baseline urinary function  Description  INTERVENTIONS:  - Assess urinary function  - Encourage oral fluids to ensure adequate hydration  - Administer IV fluids as ordered to ensure adequate hydration  - Administer ordered medications as needed  - Offer frequent toileting  - Follow urinary retention protocol if ordered  Outcome: Progressing  Goal: Absence of urinary retention  Description  INTERVENTIONS:  - Assess patients ability to void and empty bladder  - Monitor I/O  - Bladder scan as needed  - Discuss with physician/AP medications to alleviate retention as needed  - Discuss catheterization for long term situations as appropriate  Outcome: Progressing  Goal: Urinary catheter remains patent  Description  INTERVENTIONS:  - Assess patency of urinary catheter  - If patient has a chronic paez, consider changing catheter if non-functioning  - Follow guidelines for intermittent irrigation of non-functioning urinary catheter  Outcome: Progressing     Problem: METABOLIC, FLUID AND ELECTROLYTES - ADULT  Goal: Electrolytes maintained within normal limits  Description  INTERVENTIONS:  - Monitor labs and assess patient for signs and symptoms of electrolyte imbalances  - Administer electrolyte replacement as ordered  - Monitor response to electrolyte replacements, including repeat lab results as appropriate  - Instruct patient on fluid and nutrition as appropriate  Outcome: Progressing  Goal: Fluid balance maintained  Description  INTERVENTIONS:  - Monitor labs and assess for signs and symptoms of volume excess or deficit  - Monitor I/O and WT  - Instruct patient on fluid and nutrition as appropriate  Outcome: Progressing  Goal: Glucose maintained within target range  Description  INTERVENTIONS:  - Monitor Blood Glucose as ordered  - Assess for signs and symptoms of hyperglycemia and hypoglycemia  - Administer ordered medications to maintain glucose within target range  - Assess nutritional intake and initiate nutrition service referral as needed  Outcome: Progressing     Problem: SKIN/TISSUE INTEGRITY - ADULT  Goal: Skin integrity remains intact  Description  INTERVENTIONS  - Identify patients at risk for skin breakdown  - Assess and monitor skin integrity  - Assess and monitor nutrition and hydration status  - Monitor labs (i e  albumin)  - Assess for incontinence   - Turn and reposition patient  - Assist with mobility/ambulation  - Relieve pressure over bony prominences  - Avoid friction and shearing  - Provide appropriate hygiene as needed including keeping skin clean and dry  - Evaluate need for skin moisturizer/barrier cream  - Collaborate with interdisciplinary team (i e  Nutrition, Rehabilitation, etc )   - Patient/family teaching  Outcome: Progressing  Goal: Oral mucous membranes remain intact  Description  INTERVENTIONS  - Assess oral mucosa and hygiene practices  - Implement preventative oral hygiene regimen  - Implement oral medicated treatments as ordered  - Initiate Nutrition services referral as needed  Outcome: Progressing     Problem: MUSCULOSKELETAL - ADULT  Goal: Maintain or return mobility to safest level of function  Description  INTERVENTIONS:  - Assess patient's ability to carry out ADLs; assess patient's baseline for ADL function and identify physical deficits which impact ability to perform ADLs (bathing, care of mouth/teeth, toileting, grooming, dressing, etc )  - Assess/evaluate cause of self-care deficits   - Assess range of motion  - Assess patient's mobility; develop plan if impaired  - Assess patient's need for assistive devices and provide as appropriate  - Encourage maximum independence but intervene and supervise when necessary  - Involve family in performance of ADLs  - Assess for home care needs following discharge   - Request OT consult to assist with ADL evaluation and planning for discharge  - Provide patient education as appropriate  Outcome: Progressing  Goal: Maintain proper alignment of affected body part  Description  INTERVENTIONS:  - Support, maintain and protect limb and body alignment  - Provide pt/fam with appropriate education  Outcome: Progressing     Problem: INFECTION - ADULT  Goal: Absence or prevention of progression during hospitalization  Description  INTERVENTIONS:  - Assess and monitor for signs and symptoms of infection  - Monitor lab/diagnostic results  - Monitor all insertion sites, i e  indwelling lines, tubes, and drains  - Monitor endotracheal (as able) and nasal secretions for changes in amount and color  - [place] appropriate cooling/warming therapies per order  - Administer medications as ordered  - Instruct and encourage patient and family to use good hand hygiene technique  - Identify and instruct in appropriate isolation precautions for identified infection/condition  Outcome: Progressing  Goal: Absence of fever/infection during neutropenic period  Description  INTERVENTIONS:  - Monitor WBC  - Implement neutropenic guidelines  Outcome: Progressing     Problem: SAFETY ADULT  Goal: Maintain or return to baseline ADL function  Description  INTERVENTIONS:  -  Assess patient's ability to carry out ADLs; assess patient's baseline for ADL function and identify physical deficits which impact ability to perform ADLs (bathing, care of mouth/teeth, toileting, grooming, dressing, etc )  - Assess/evaluate cause of self-care deficits   - Assess range of motion  - Assess patient's mobility; develop plan if impaired  - Assess patient's need for assistive devices and provide as appropriate  - Encourage maximum independence but intervene and supervise when necessary  ¯ Involve family in performance of ADLs  ¯ Assess for home care needs following discharge   ¯ Request OT consult to assist with ADL evaluation and planning for discharge  ¯ Provide patient education as appropriate  Outcome: Progressing  Goal: Maintain or return mobility status to optimal level  Description  INTERVENTIONS:  - Assess patient's baseline mobility status (ambulation, transfers, stairs, etc )    - Identify cognitive and physical deficits and behaviors that affect mobility  - Identify mobility aids required to assist with transfers and/or ambulation (gait belt, sit-to-stand, lift, walker, cane, etc )  - [place] fall precautions as indicated by assessment  - Record patient progress and toleration of activity level on Mobility SBAR; progress patient to next Phase/Stage  - Instruct patient to call for assistance with activity based on assessment  - Request Rehabilitation consult to assist with strengthening/weightbearing, etc   Outcome: Progressing     Problem: Knowledge Deficit  Goal: Patient/family/caregiver demonstrates understanding of disease process, treatment plan, medications, and discharge instructions  Description  Complete learning assessment and assess knowledge base    Interventions:  - Provide teaching at level of understanding  - Provide teaching via preferred learning methods  Outcome: Progressing

## 2019-07-18 NOTE — ASSESSMENT & PLAN NOTE
· Anion gap elevated to 18 on admission, likely secondary to lactic acidosis s/p code  · Monitor and trend for clearance  · Lactic acid already down trending to 5 from 12

## 2019-07-18 NOTE — H&P
History and Physical - Critical Care/ Stepdown   Torito Hoffman 68 y o  female MRN: [id number]  Unit/Bed#: ICU 11 Encounter: [id number]    Reason for Admission / Chief Complaint: PEA arrest     History of Present Illness:  Torito Hoffman is a 68 y o  female who presents s/p PEA arrest  Past medical history of systolic heart failure, pacemaker, diabetes, COPD, CAD s/p MAURIZIO to LAD in 2015, and HTN  Per EMS, patient called 911 secondary to shortness of breath at approximately 1130 on 7/17  EMS arrived and found patient in PEA  CPR was initiated in the field where she was coded for approximately 45 minutes and received 6 rounds of Epinephrine  She was intubated pre-hospital during arrest  On arrival, ROSC was achieved and she was found to be in Afib with RVR  Patient had another episode of PEA with ROSC after 1 round of Epinephrine in the ED  Upon assessment, patient is unresponsive to stimuli with pupils that are fixed and dilated  CXR concerning for right sided pneumonia  Laboratory data significant for leukocytosis of 28k with 15% bandemia, creatinine 1 5, lactic acid of 12 9, and transaminitis  Initial ABG showed mixed respiratory and metabolic acidos: 6 0/95/472/00 5  EKG upon obtaining ROSC shows Aflutter with 2:1 block without acute ST changes changes  CT head compatible with global hypoxic ischemic injury  Patient was started on empiric antibiotics, given a total of 4amps of bicarb and transferred to the ICU for initiation of targeted temperature management, as well as further workup and monitoring  Multiple attempts were made to contact the family were unsuccessful  Multiple voicemails left to return call  History obtained from chart review      Past Medical History:  Past Medical History:   Diagnosis Date    Cardiac disease        Past Surgical History:  Past Surgical History:   Procedure Laterality Date    CARDIAC PACEMAKER PLACEMENT      CARDIAC SURGERY         Past Family History:  History reviewed  No pertinent family history  Social History:  Social History     Tobacco Use   Smoking Status Never Smoker   Smokeless Tobacco Never Used      Social History     Substance and Sexual Activity   Alcohol Use No    Frequency: Patient refused    Drinks per session: Patient refused    Binge frequency: Patient refused     Social History     Substance and Sexual Activity   Drug Use No     Marital Status:     Medications:  Current Facility-Administered Medications   Medication Dose Route Frequency    aspirin chewable tablet 81 mg  81 mg Oral Daily    atorvastatin (LIPITOR) tablet 80 mg  80 mg Oral Daily    cefepime (MAXIPIME) IVPB (premix) 1,000 mg  1,000 mg Intravenous Q12H    chlorhexidine (PERIDEX) 0 12 % oral rinse 15 mL  15 mL Swish & Spit Q12H [facility]    EPINEPHrine (ADRENALIN) 1 mg/mL injection **ADS Override Pull**        heparin (porcine) 25,000 units in 250 mL infusion (premix)  3-20 Units/kg/hr (Order-Specific) Intravenous Titrated    heparin (porcine) injection 2,000 Units  2,000 Units Intravenous PRN    heparin (porcine) injection 4,000 Units  4,000 Units Intravenous PRN    insulin regular (HumuLIN R,NovoLIN R) 1 Units/mL in sodium chloride 0 9 % 100 mL infusion  0 3-21 Units/hr Intravenous Titrated    levalbuterol (XOPENEX) inhalation solution 0 63 mg  0 63 mg Nebulization Q8H PRN    metroNIDAZOLE (FLAGYL) IVPB (premix) 500 mg  500 mg Intravenous Q8H    multi-electrolyte (ISOLYTE-S PH 7 4 equivalent) IV solution  75 mL/hr Intravenous Continuous    multi-electrolyte (ISOLYTE-S PH 7 4) bolus 500 mL  500 mL Intravenous Once    propofol (DIPRIVAN) 1000 mg in 100 mL infusion (premix)  5-50 mcg/kg/min Intravenous Titrated    [START ON 7/19/2019] vancomycin (VANCOCIN) IVPB (premix) 1,000 mg  15 mg/kg (Adjusted) Intravenous Q24H     Home medications:  Prior to Admission medications    Medication Sig Start Date End Date Taking?  Authorizing Provider   albuterol (ACCUNEB) 1 25 MG/3ML nebulizer solution Take 1 ampule by nebulization every 6 (six) hours as needed for wheezing    Historical Provider, MD   albuterol (PROVENTIL HFA,VENTOLIN HFA) 90 mcg/act inhaler Inhale 2 puffs every 6 (six) hours as needed for wheezing    Historical Provider, MD   aspirin 81 mg chewable tablet Chew 81 mg daily    Historical Provider, MD   atorvastatin (LIPITOR) 80 mg tablet Take 80 mg by mouth daily    Historical Provider, MD   carvedilol (COREG) 12 5 mg tablet Take 12 5 mg by mouth 2 (two) times a day with meals    Historical Provider, MD   fluticasone (FLONASE) 50 mcg/act nasal spray 1 spray into each nostril as needed for rhinitis    Historical Provider, MD   sacubitril-valsartan (ENTRESTO) 24-26 MG TABS Take 1 tablet by mouth 2 (two) times a day    Historical Provider, MD     Allergies: Allergies   Allergen Reactions    Penicillins      rash       ROS:   Review of Systems   Unable to perform ROS: Intubated (shortness of breath per EMS)       Vitals:  Vitals:    19 0615 19 0630 19 0645 19 0700   BP:       BP Location:       Pulse: (!) 111 (!) 111 104    Resp: (!) 28 (!) 28 (!) 28    Temp: (!) 96 1 °F (35 6 °C) 97 9 °F (36 6 °C) (!) 97 3 °F (36 3 °C) (!) 96 6 °F (35 9 °C)   TempSrc:       SpO2: 97% 97% 98%    Weight:       Height:         Temperature:   Temp (24hrs), Av 9 °F (36 6 °C), Min:96 1 °F (35 6 °C), Max:99 3 °F (37 4 °C)    Current Temperature: (!) 96 6 °F (35 9 °C)    Weights:   IBW: 50 1 kg  Body mass index is 34 96 kg/m²      Hemodynamic Monitoring:  N/A     Non-Invasive/Invasive Ventilation Settings:  Respiratory    Lab Data (Last 4 hours)       0318  0452          pH, Arterial       7 201          7 365          pCO2, Arterial       67 1          35 7          pO2, Arterial       94 4          232 8          HCO3, Arterial       25 7          19 9          Base Excess, Arterial       -4 1          -4 6               O2/Vent Data        0300  6977 Most Recent       Vent Mode  AC/VC  AC/VC     Resp Rate (BPM) (BPM) 28 28  28     Vt (mL) (mL) 450 450  450     FIO2 (%) (%)  100  70     PEEP (cmH2O) (cmH2O)  8  8     MV 9 3 14 3  14 3               Lab Results   Component Value Date    PHART 7 365 07/18/2019    ILA9FUO 35 7 (L) 07/18/2019    PO2ART 232 8 (H) 07/18/2019    KXZ8TAE 19 9 (L) 07/18/2019    BEART -4 6 07/18/2019    SOURCE Line, Arterial 07/18/2019     SpO2: SpO2: 98 %     Physical Exam:  Physical Exam   Constitutional: She appears well-developed and well-nourished  She is intubated  HENT:   Head: Normocephalic and atraumatic  Eyes:   4mm bilaterally, fixed and dilated    Neck: Normal range of motion  Neck supple  No JVD present  Cardiovascular: Normal rate, regular rhythm and normal heart sounds  No murmur heard  Pulmonary/Chest: No accessory muscle usage  She is intubated  No respiratory distress  She has decreased breath sounds in the right middle field, the right lower field, the left middle field and the left lower field  Abdominal: Soft  Bowel sounds are normal  She exhibits no distension  Large amount of liquid stool present    Musculoskeletal: Normal range of motion  She exhibits no edema  Neurological: GCS eye subscore is 1  GCS verbal subscore is 1  GCS motor subscore is 1  Generalized fine myoclonus  No cough/no gag/ no corneals   Skin: Skin is warm and dry  She is not diaphoretic  No erythema         Labs:  Results from last 7 days   Lab Units 07/18/19  0451 07/18/19  0120   WBC Thousand/uL 19 39* 28 93*   HEMOGLOBIN g/dL 15 0 11 1*   HEMATOCRIT % 45 2 37 0   PLATELETS Thousands/uL 285 203   NEUTROS PCT % 79*  --    MONOS PCT % 4  --    MONO PCT %  --  3*      Results from last 7 days   Lab Units 07/18/19  0451 07/18/19  0120   SODIUM mmol/L 141 141   POTASSIUM mmol/L 4 2 4 2   CHLORIDE mmol/L 104 106   CO2 mmol/L 22 18*   BUN mg/dL 22 17   CREATININE mg/dL 1 58* 1 57*   CALCIUM mg/dL 7 9* 8 0*   ALK PHOS U/L  --  180* ALT U/L  --  174*   AST U/L  --  231*     Results from last 7 days   Lab Units 07/18/19  0451   MAGNESIUM mg/dL 2 0          Results from last 7 days   Lab Units 07/18/19  0647   INR  1 22*   PTT seconds 27     Results from last 7 days   Lab Units 07/18/19  0451 07/18/19  0120   LACTIC ACID mmol/L 5 6* 12 9*     0   Lab Value Date/Time    TROPONINI 2 53 (H) 07/18/2019 0450    TROPONINI 0 08 (H) 07/18/2019 0120       Imaging: CXR: significant right sided infiltrates I have personally reviewed pertinent films in PACS  CT: CT head Hypodensity of the lentiform and caudate nuclei bilaterally compatible with global hypoxic ischemic injury  I have personally reviewed pertinent reports  and I have personally reviewed pertinent films in PACS  EKG: Aflutter with 2:1 block on EKG  This was personally reviewed by myself  Micro:  Blood Culture: No results found for: BLOODCX  Urine Culture: No results found for: URINECX  Sputum Culture: No components found for: SPUTUMCX  Wound Culure: No results found for: WOUNDCULT  ______________________________________________________________________    Assessment:   Principal Problem:    PEA (Pulseless electrical activity) ([facility] [address] )  Active Problems:    Severe sepsis ([facility] [address] )    Acute respiratory failure with hypoxia and hypercapnia (HCC)    Systolic heart failure (HCC)    Hypoxic brain injury ([facility] [address] )    Lactic acid acidosis    High anion gap metabolic acidosis    Hyperglycemia    Atrial flutter (HCC)    Elevated troponin  Resolved Problems:    * No resolved hospital problems  *      * PEA (Pulseless electrical activity) ([facility] [address] )  Assessment & Plan  · Patient found PEA in the field after she called EMS related to shortness of breath  · Likely precipitated by hypoxia but cannot rule out cardiac event   · Received CPR for 45 minutes with 6 rounds of Epinephrine and intubated in the field  · Upon arrival to ER, ROSC achieved   Patient had one other episode of PEA and achieved ROSC after 1 round of epi  · After ROSC, patient in [address] with rates 140, no acute ST changes  · Pupils fixed and dilated post arrest, no spontaneous movement  · TTM initiated at 36 degrees celsius   · CT head showed evidence of anoxic brain injury  · Unable to reach family at this time, continue full medical treatment     Severe sepsis [facility])  Assessment & Plan  · Patient met severe sepsis criteria on admission, likely a pulmonary source  · Right sided infiltrates on Xray  · Leukocytosis and bandemia present  · Will cover with cefepime/vanco and add flagyl for aspiration coverage   · Trend procalcitonins, fever curve and white count   · Blood cultures pending  · Sputum cx pending    Systolic heart failure ([facility] [address] )  Assessment & Plan  Wt Readings from Last 3 Encounters:   07/18/19 86 7 kg (191 lb 2 2 oz)       · History of systolic heart failure  Patient follows with [facility]  · EF from [facility] perfusion scan in 2017 showed EF of 10%  · Will obtain echo for review  · Extremities warm, hemodynamically stable  · Does not appear to be in cardiogenic shock at this time       Acute respiratory failure with hypoxia and hypercapnia (HCC)  Assessment & Plan  · Intubated prehospital during arrest  · Initial ABG showed mixed metabolic and respiratory acidosis  · Vent settings adjusted: AC 28 x 450 70% 8PEEP  · Wean Fio2, PEEP for goal spo2 >94%  · Acute respiratory failure likely secondary to right sided pneumonia  · Continue empiric coverage for now with cefepime, vanco, will add flagyl for aspiration coverage  · Pulmonary hygiene  · Sputum cx pending    Hypoxic brain injury ([facility] [address] )  Assessment & Plan  · CT head shows evidence of global anoxic brain injury  · NSE sent   · Continue TTM   · Multiple attempts made to reach the family for goals of care discussion, no answer   Voicemail left to return call     Elevated troponin  Assessment & Plan  · Patient s/p PEA arrest  · Troponin initially 0 08, increased to 2 5  · Likely secondary to arrest  EKG without acute ST changes  · Will obtain Echocardiogram to assess for RWMA  · Will start heparin gtt for ACS    Atrial flutter ([facility] [address] )  Assessment & Plan  · Upon achieving ROSC, patient in Aflutter with 2:1 block  · HR 140s with SBP 180s  · Given 1 dose of IV lopressor with improvement in HR to 110s  · Repeat EKG shows sinus tachycardia    Hyperglycemia  Assessment & Plan  · Patient hyperglycemic, likely secondary to epinphrine administration and stress response  · Does have baseline diabetes  · Will initiate insulin gtt for improved control  · Negative ketones in urine  · Beta hydroxybutyrate 0 2  · Goal SBP <180    High anion gap metabolic acidosis  Assessment & Plan  · Anion gap elevated to 18 on admission, likely secondary to lactic acidosis s/p code  · Monitor and trend for clearance  · Lactic acid already down trending to 5 from 12    Lactic acid acidosis  Assessment & Plan  · Lactic acid 12 9 on admission, likely secondary to cardiac arrest  · Down trended to 5 6, continue to trend      Plan:    Neuro:    Anoxic brain injury  · Continue TTM to 36 degrees  · Will check NSE  · Avoid hypotension   · Propofol for sedation and fine myoclonus     CV:   S/P PEA arrest  · Unknown cause of arrest, possibly hypoxia driven vs MI  · Monitor troponins  · Echocardiogram   · BP now stable  Aflutter  · Post arrest patient in Aflutter with 2:1 block  · 1 dose of 5mg IV lopressor given for HR 140s  · HR now 110s  · Will repeat EKG     Pulm:   Acute hypoxic respiratory failure   · AC 28 x 450 70% 8  · ABG initially with mixed respiratory/ metabolic acidosis, now improved  · Pulmonary hygiene    GI:   · Possible component of ischemic bowel secondary to arrest  · Patient with multiple loose stools   · Continue to trend lactics  · Trend liver enzymes    :  · Creatinine 1 5, continue to monitor and trend  · I&Os  · Avoid nephrotoxins    FEN:   · IV hydration with Isolyte  · Will repeat labs and adjust fluid resuscitation   · Goal K>4, Mg>2    ID:   · Severe sepsis with presumed pulmonary source POA  · CXR with right sided opacities  · Cefepime/vanc/flagyl to cover for aspiration pneumonia  · Sputum cx, blood cultures pending  · Trend fever curve, WBC and procal    Heme:   · Hemoglobin stable on admission, will recheck this morning   · One stool with minimal red streak  · Continue to monitor Hgb and platelets  · Will check coags  · Initiate heparin for ACS    Endo:   · Hyperglycemic on admission  · Insulin gtt for improved management     MSK/Skin:   · Turn q2/frequent skin assessments    Family:  · Family updated: no  Unable to reach family  Voicemail left  Disposition: Admit to ICU    Counseling / Coordination of Care  Total Critical Care time spent 65 minutes excluding procedures, teaching and family updates  ______________________________________________________________________    VTE Pharmacologic Prophylaxis: Heparin  VTE Mechanical Prophylaxis: sequential compression device    Invasive lines and devices: Invasive Devices     Peripheral Intravenous Line            Peripheral IV 07/18/19 Left Hand less than 1 day    Peripheral IV 07/18/19 Right Antecubital less than 1 day    Peripheral IV 07/18/19 Right Forearm less than 1 day    Peripheral IV 07/18/19 Right Hand less than 1 day          Arterial Line            Arterial Line 07/18/19 Radial less than 1 day          Drain            NG/OG/Enteral Tube Orogastric less than 1 day    Urethral Catheter Temperature probe less than 1 day          Airway            ETT  Cuffed 7 mm less than 1 day                Code Status: Level 1 - Full Code  POA:    POLST:      Given critical illness, patient length of stay will require greater than two midnights      Signature: PHILLY Enrique  Date: 7/18/2019

## 2019-07-18 NOTE — PROGRESS NOTES
Vancomycin Assessment    Pia Caceres is a 68 y o  female who is currently receiving vancomycin 25mg/kg(abw) loading dose, followed by 15mg/kg(abw) Q 24hrs for Pneumonia     Relevant clinical data and objective history reviewed:  Creatinine   Date Value Ref Range Status   07/18/2019 1 57 (H) 0 60 - 1 30 mg/dL Final     Comment:     Standardized to IDMS reference method   12/03/2018 1 06 0 60 - 1 30 mg/dL Final     Comment:     Standardized to IDMS reference method   05/05/2016 1 10 0 60 - 1 30 mg/dL Final     Comment:     Standardized to IDMS reference method     /100   Pulse (!) 111   Temp 98 8 °F (37 1 °C)   Resp (!) 24   Ht 5' 2" (1 575 m)   Wt 86 7 kg (191 lb 2 2 oz)   SpO2 99%   BMI 34 96 kg/m²   No intake/output data recorded  Lab Results   Component Value Date/Time    BUN 17 07/18/2019 01:20 AM    WBC 28 93 (H) 07/18/2019 01:20 AM    HGB 11 1 (L) 07/18/2019 01:20 AM    HCT 37 0 07/18/2019 01:20 AM     (H) 07/18/2019 01:20 AM     07/18/2019 01:20 AM     Temp Readings from Last 3 Encounters:   07/18/19 98 8 °F (37 1 °C)   01/02/19 99 4 °F (37 4 °C) (Tympanic)     Vancomycin Days of Therapy: 1    Assessment/Plan  The patient is currently on vancomycin utilizing scheduled dosing based on adjusted body weight (due to obesity)  Baseline risks associated with therapy include: pre-existing renal impairment, concomitant nephrotoxic medications and advanced age  The patient is currently receiving 25mg/kg(abw) loading dose, followed by 15mg/kg(abw) Q 24hrs and is clinically appropriate and dose will be continued  Pharmacy will also follow closely for s/sx of nephrotoxicity, infusion reactions and appropriateness of therapy  BMP and CBC will be ordered per protocol  Plan for trough as patient approaches steady state, prior to the 4th  dose at approximately 0330 on 7/21/19  Due to infection severity, will target a trough of 15-20 (appropriate for most indications)     Pharmacy will continue to follow the patients culture results and clinical progress daily      Mojgan Mcguire, Pharmacist

## 2019-07-18 NOTE — RESPIRATORY THERAPY NOTE
RT Protocol Note  Dorinda Alcaraz 68 y o  female MRN: [id number]  Unit/Bed#: ICU 11 Encounter: [id number]    Assessment    Active Problems:    PEA (Pulseless electrical activity) (HCC)    Acute respiratory failure with hypoxia and hypercapnia (HCC)    Hypoxic brain injury ([address] )      Home Pulmonary Medications:  Albuterol inhaler       Past Medical History:   Diagnosis Date    Cardiac disease      Social History     Socioeconomic History    Marital status:      Spouse name: None    Number of children: None    Years of education: None    Highest education level: None   Occupational History    None   Social Needs    Financial resource strain: None    Food insecurity:     Worry: None     Inability: None    Transportation needs:     Medical: None     Non-medical: None   Tobacco Use    Smoking status: Never Smoker    Smokeless tobacco: Never Used   Substance and Sexual Activity    Alcohol use: No    Drug use: No    Sexual activity: None   Lifestyle    Physical activity:     Days per week: None     Minutes per session: None    Stress: None   Relationships    Social connections:     Talks on phone: None     Gets together: None     Attends [employer] service: None     Active member of club or organization: None     Attends meetings of clubs or organizations: None     Relationship status: None    Intimate partner violence:     Fear of current or ex partner: None     Emotionally abused: None     Physically abused: None     Forced sexual activity: None   Other Topics Concern    None   Social History Narrative    None       Subjective         Objective    Physical Exam:   Assessment Type: Assess only  General Appearance: Sedated  Respiratory Pattern: Assisted  Chest Assessment: Chest expansion symmetrical  Bilateral Breath Sounds: Unable to assess  O2 Device: vent    Vitals:  Blood pressure (!) 177/107, pulse (!) 143, temperature 97 6 °F (36 4 °C), temperature source Tympanic, resp   rate (!) 29, weight 86 7 kg (191 lb 2 2 oz), SpO2 94 %      Results from last 7 days   Lab Units 07/18/19  0318   PH ART  7 201*   PCO2 ART mm Hg 67 1*   PO2 ART mm Hg 94 4   HCO3 ART mmol/L 25 7   BASE EXC ART mmol/L -4 1   O2 CONTENT ART mL/dL 21 8   O2 HGB, ARTERIAL % 95 7   ABG SOURCE  Radial, Right   SANDYHA TEST  Yes       O2 Device: vent     Plan    Respiratory Plan: Vent/NIV/HFNC

## 2019-07-18 NOTE — PLAN OF CARE
Problem: RESPIRATORY - ADULT  Goal: Achieves optimal ventilation and oxygenation  Description  INTERVENTIONS:  - Assess for changes in respiratory status  - Assess for changes in mentation and behavior  - Position to facilitate oxygenation and minimize respiratory effort  - Oxygen administration by appropriate delivery method based on oxygen saturation (per order) or ABGs  - Encourage broncho-pulmonary hygiene including cough, deep breathe, Incentive Spirometry  - Assess the need for suctioning and aspirate as needed  - Assess and instruct to report SOB or any respiratory difficulty  - Respiratory Therapy support as indicate-  -Ventilator support   Outcome: Progressing     Problem: Prexisting or High Potential for Compromised Skin Integrity  Goal: Skin integrity is maintained or improved  Description  INTERVENTIONS:  - Identify patients at risk for skin breakdown  - Assess and monitor skin integrity  - Assess and monitor nutrition and hydration status  - Monitor labs (i e  albumin)  - Assess for incontinence   - Turn and reposition patient  - Assist with mobility/ambulation  - Relieve pressure over bony prominences  - Avoid friction and shearing  - Provide appropriate hygiene as needed including keeping skin clean and dry  - Evaluate need for skin moisturizer/barrier cream  - Collaborate with interdisciplinary team (i e  Nutrition, Rehabilitation, etc )   - Patient/family teaching  Outcome: Progressing     Problem: Potential for Falls  Goal: Patient will remain free of falls  Description  INTERVENTIONS:  - Assess patient frequently for physical needs  -  Identify cognitive and physical deficits and behaviors that affect risk of falls    -  [place] fall precautions as indicated by assessment   - Educate patient/family on patient safety including physical limitations  - Instruct patient to call for assistance with activity based on assessment  - Modify environment to reduce risk of injury  - Consider OT/PT consult to assist with strengthening/mobility  Outcome: Progressing     Problem: NEUROSENSORY - ADULT  Goal: Achieves stable or improved neurological status  Description  INTERVENTIONS  - Monitor and report changes in neurological status  - Initiate measures to prevent increased intracranial pressure  - Maintain blood pressure and fluid volume within ordered parameters to optimize cerebral perfusion  - Monitor temperature, glucose, and sodium or any other associated labs   Initiate appropriate interventions as ordered  - Monitor for seizure activity   - Administer anti-seizure medications as ordered  Outcome: Progressing  Goal: Absence of seizures  Description  INTERVENTIONS  - Monitor for seizure activity  - Administer anti-seizure medications as ordered  - Monitor neurological status  Outcome: Progressing  Goal: Remains free of injury related to seizures activity  Description  INTERVENTIONS  - Maintain airway, patient safety  and administer oxygen as ordered  - Monitor patient for seizure activity, document and report duration and description of seizure to physician/advanced practitioner  - If seizure occurs,  ensure patient safety during seizure  - Reorient patient post seizure  - Seizure pads on all 4 side rails  - Instruct patient/family to notify RN of any seizure activity including if an aura is experienced  - Instruct patient/family to call for assistance with activity based on nursing assessment  - Administer anti-seizure medications as ordered  - Monitor fetal well being  Outcome: Progressing     Problem: CARDIOVASCULAR - ADULT  Goal: Maintains optimal cardiac output and hemodynamic stability  Description  INTERVENTIONS:  - Monitor I/O, vital signs and rhythm  - Monitor for S/S and trends of decreased cardiac output i e  bleeding, hypotension  - Administer and titrate ordered vasoactive medications to optimize hemodynamic stability  - Assess quality of pulses, skin color and temperature  - Assess for signs of decreased coronary artery perfusion - ex   Angina  - Instruct patient to report change in severity of symptoms  Outcome: Progressing  Goal: Absence of cardiac dysrhythmias or at baseline rhythm  Description  INTERVENTIONS:  - Continuous cardiac monitoring, monitor vital signs, obtain 12 lead EKG if indicated  - Administer antiarrhythmic and heart rate control medications as ordered  - Monitor electrolytes and administer replacement therapy as ordered  Outcome: Progressing     Problem: GENITOURINARY - ADULT  Goal: Maintains or returns to baseline urinary function  Description  INTERVENTIONS:  - Assess urinary function  - Encourage oral fluids to ensure adequate hydration  - Administer IV fluids as ordered to ensure adequate hydration  - Administer ordered medications as needed  - Offer frequent toileting  - Follow urinary retention protocol if ordered  Outcome: Progressing  Goal: Absence of urinary retention  Description  INTERVENTIONS:  - Assess patients ability to void and empty bladder  - Monitor I/O  - Bladder scan as needed  - Discuss with physician/AP medications to alleviate retention as needed  - Discuss catheterization for long term situations as appropriate  Outcome: Progressing  Goal: Urinary catheter remains patent  Description  INTERVENTIONS:  - Assess patency of urinary catheter  - If patient has a chronic paez, consider changing catheter if non-functioning  - Follow guidelines for intermittent irrigation of non-functioning urinary catheter  Outcome: Progressing     Problem: METABOLIC, FLUID AND ELECTROLYTES - ADULT  Goal: Electrolytes maintained within normal limits  Description  INTERVENTIONS:  - Monitor labs and assess patient for signs and symptoms of electrolyte imbalances  - Administer electrolyte replacement as ordered  - Monitor response to electrolyte replacements, including repeat lab results as appropriate  - Instruct patient on fluid and nutrition as appropriate  Outcome: Progressing  Goal: Fluid balance maintained  Description  INTERVENTIONS:  - Monitor labs and assess for signs and symptoms of volume excess or deficit  - Monitor I/O and WT  - Instruct patient on fluid and nutrition as appropriate  Outcome: Progressing  Goal: Glucose maintained within target range  Description  INTERVENTIONS:  - Monitor Blood Glucose as ordered  - Assess for signs and symptoms of hyperglycemia and hypoglycemia  - Administer ordered medications to maintain glucose within target range  - Assess nutritional intake and initiate nutrition service referral as needed  Outcome: Progressing     Problem: SKIN/TISSUE INTEGRITY - ADULT  Goal: Skin integrity remains intact  Description  INTERVENTIONS  - Identify patients at risk for skin breakdown  - Assess and monitor skin integrity  - Assess and monitor nutrition and hydration status  - Monitor labs (i e  albumin)  - Assess for incontinence   - Turn and reposition patient  - Assist with mobility/ambulation  - Relieve pressure over bony prominences  - Avoid friction and shearing  - Provide appropriate hygiene as needed including keeping skin clean and dry  - Evaluate need for skin moisturizer/barrier cream  - Collaborate with interdisciplinary team (i e  Nutrition, Rehabilitation, etc )   - Patient/family teaching  Outcome: Progressing  Goal: Oral mucous membranes remain intact  Description  INTERVENTIONS  - Assess oral mucosa and hygiene practices  - Implement preventative oral hygiene regimen  - Implement oral medicated treatments as ordered  - Initiate Nutrition services referral as needed  Outcome: Progressing     Problem: MUSCULOSKELETAL - ADULT  Goal: Maintain or return mobility to safest level of function  Description  INTERVENTIONS:  - Assess patient's ability to carry out ADLs; assess patient's baseline for ADL function and identify physical deficits which impact ability to perform ADLs (bathing, care of mouth/teeth, toileting, grooming, dressing, etc )  - Assess/evaluate cause of self-care deficits   - Assess range of motion  - Assess patient's mobility; develop plan if impaired  - Assess patient's need for assistive devices and provide as appropriate  - Encourage maximum independence but intervene and supervise when necessary  - Involve family in performance of ADLs  - Assess for home care needs following discharge   - Request OT consult to assist with ADL evaluation and planning for discharge  - Provide patient education as appropriate  Outcome: Progressing  Goal: Maintain proper alignment of affected body part  Description  INTERVENTIONS:  - Support, maintain and protect limb and body alignment  - Provide pt/fam with appropriate education  Outcome: Progressing     Problem: INFECTION - ADULT  Goal: Absence or prevention of progression during hospitalization  Description  INTERVENTIONS:  - Assess and monitor for signs and symptoms of infection  - Monitor lab/diagnostic results  - Monitor all insertion sites, i e  indwelling lines, tubes, and drains  - Monitor endotracheal (as able) and nasal secretions for changes in amount and color  - [place] appropriate cooling/warming therapies per order  - Administer medications as ordered  - Instruct and encourage patient and family to use good hand hygiene technique  - Identify and instruct in appropriate isolation precautions for identified infection/condition  Outcome: Progressing  Goal: Absence of fever/infection during neutropenic period  Description  INTERVENTIONS:  - Monitor WBC  - Implement neutropenic guidelines  Outcome: Progressing     Problem: SAFETY ADULT  Goal: Maintain or return to baseline ADL function  Description  INTERVENTIONS:  -  Assess patient's ability to carry out ADLs; assess patient's baseline for ADL function and identify physical deficits which impact ability to perform ADLs (bathing, care of mouth/teeth, toileting, grooming, dressing, etc )  - Assess/evaluate cause of self-care deficits   - Assess range of motion  - Assess patient's mobility; develop plan if impaired  - Assess patient's need for assistive devices and provide as appropriate  - Encourage maximum independence but intervene and supervise when necessary  ¯ Involve family in performance of ADLs  ¯ Assess for home care needs following discharge   ¯ Request OT consult to assist with ADL evaluation and planning for discharge  ¯ Provide patient education as appropriate  Outcome: Progressing  Goal: Maintain or return mobility status to optimal level  Description  INTERVENTIONS:  - Assess patient's baseline mobility status (ambulation, transfers, stairs, etc )    - Identify cognitive and physical deficits and behaviors that affect mobility  - Identify mobility aids required to assist with transfers and/or ambulation (gait belt, sit-to-stand, lift, walker, cane, etc )  - [place] fall precautions as indicated by assessment  - Record patient progress and toleration of activity level on Mobility SBAR; progress patient to next Phase/Stage  - Instruct patient to call for assistance with activity based on assessment  - Request Rehabilitation consult to assist with strengthening/weightbearing, etc   Outcome: Progressing     Problem: Knowledge Deficit  Goal: Patient/family/caregiver demonstrates understanding of disease process, treatment plan, medications, and discharge instructions  Description  Complete learning assessment and assess knowledge base    Interventions:  - Provide teaching at level of understanding  - Provide teaching via preferred learning methods  Outcome: Progressing

## 2019-07-18 NOTE — ASSESSMENT & PLAN NOTE
· Patient met severe sepsis criteria on admission, likely a pulmonary source  · Right sided infiltrates on Xray  · Leukocytosis and bandemia present  · Will cover with cefepime/vanco and add flagyl for aspiration coverage   · Trend procalcitonins, fever curve and white count   · Blood cultures pending  · Sputum cx pending

## 2019-07-18 NOTE — PLAN OF CARE
Problem: RESPIRATORY - ADULT  Goal: Achieves optimal ventilation and oxygenation  Description  INTERVENTIONS:  - Assess for changes in respiratory status  - Assess for changes in mentation and behavior  - Position to facilitate oxygenation and minimize respiratory effort  - Oxygen administration by appropriate delivery method based on oxygen saturation (per order) or ABGs  - Encourage broncho-pulmonary hygiene including cough, deep breathe, Incentive Spirometry  - Assess the need for suctioning and aspirate as needed  - Assess and instruct to report SOB or any respiratory difficulty  - Respiratory Therapy support as indicate-  -Ventilator support   Outcome: Completed     Problem: Prexisting or High Potential for Compromised Skin Integrity  Goal: Skin integrity is maintained or improved  Description  INTERVENTIONS:  - Identify patients at risk for skin breakdown  - Assess and monitor skin integrity  - Assess and monitor nutrition and hydration status  - Monitor labs (i e  albumin)  - Assess for incontinence   - Turn and reposition patient  - Assist with mobility/ambulation  - Relieve pressure over bony prominences  - Avoid friction and shearing  - Provide appropriate hygiene as needed including keeping skin clean and dry  - Evaluate need for skin moisturizer/barrier cream  - Collaborate with interdisciplinary team (i e  Nutrition, Rehabilitation, etc )   - Patient/family teaching  Outcome: Completed     Problem: Potential for Falls  Goal: Patient will remain free of falls  Description  INTERVENTIONS:  - Assess patient frequently for physical needs  -  Identify cognitive and physical deficits and behaviors that affect risk of falls    -  [place] fall precautions as indicated by assessment   - Educate patient/family on patient safety including physical limitations  - Instruct patient to call for assistance with activity based on assessment  - Modify environment to reduce risk of injury  - Consider OT/PT consult to assist with strengthening/mobility  Outcome: Completed     Problem: NEUROSENSORY - ADULT  Goal: Achieves stable or improved neurological status  Description  INTERVENTIONS  - Monitor and report changes in neurological status  - Initiate measures to prevent increased intracranial pressure  - Maintain blood pressure and fluid volume within ordered parameters to optimize cerebral perfusion  - Monitor temperature, glucose, and sodium or any other associated labs   Initiate appropriate interventions as ordered  - Monitor for seizure activity   - Administer anti-seizure medications as ordered  Outcome: Completed  Goal: Absence of seizures  Description  INTERVENTIONS  - Monitor for seizure activity  - Administer anti-seizure medications as ordered  - Monitor neurological status  Outcome: Completed  Goal: Remains free of injury related to seizures activity  Description  INTERVENTIONS  - Maintain airway, patient safety  and administer oxygen as ordered  - Monitor patient for seizure activity, document and report duration and description of seizure to physician/advanced practitioner  - If seizure occurs,  ensure patient safety during seizure  - Reorient patient post seizure  - Seizure pads on all 4 side rails  - Instruct patient/family to notify RN of any seizure activity including if an aura is experienced  - Instruct patient/family to call for assistance with activity based on nursing assessment  - Administer anti-seizure medications as ordered  - Monitor fetal well being  Outcome: Completed     Problem: CARDIOVASCULAR - ADULT  Goal: Maintains optimal cardiac output and hemodynamic stability  Description  INTERVENTIONS:  - Monitor I/O, vital signs and rhythm  - Monitor for S/S and trends of decreased cardiac output i e  bleeding, hypotension  - Administer and titrate ordered vasoactive medications to optimize hemodynamic stability  - Assess quality of pulses, skin color and temperature  - Assess for signs of decreased coronary artery perfusion - ex   Angina  - Instruct patient to report change in severity of symptoms  Outcome: Completed  Goal: Absence of cardiac dysrhythmias or at baseline rhythm  Description  INTERVENTIONS:  - Continuous cardiac monitoring, monitor vital signs, obtain 12 lead EKG if indicated  - Administer antiarrhythmic and heart rate control medications as ordered  - Monitor electrolytes and administer replacement therapy as ordered  Outcome: Completed     Problem: GENITOURINARY - ADULT  Goal: Maintains or returns to baseline urinary function  Description  INTERVENTIONS:  - Assess urinary function  - Encourage oral fluids to ensure adequate hydration  - Administer IV fluids as ordered to ensure adequate hydration  - Administer ordered medications as needed  - Offer frequent toileting  - Follow urinary retention protocol if ordered  Outcome: Completed  Goal: Absence of urinary retention  Description  INTERVENTIONS:  - Assess patients ability to void and empty bladder  - Monitor I/O  - Bladder scan as needed  - Discuss with physician/AP medications to alleviate retention as needed  - Discuss catheterization for long term situations as appropriate  Outcome: Completed  Goal: Urinary catheter remains patent  Description  INTERVENTIONS:  - Assess patency of urinary catheter  - If patient has a chronic paez, consider changing catheter if non-functioning  - Follow guidelines for intermittent irrigation of non-functioning urinary catheter  Outcome: Completed     Problem: METABOLIC, FLUID AND ELECTROLYTES - ADULT  Goal: Electrolytes maintained within normal limits  Description  INTERVENTIONS:  - Monitor labs and assess patient for signs and symptoms of electrolyte imbalances  - Administer electrolyte replacement as ordered  - Monitor response to electrolyte replacements, including repeat lab results as appropriate  - Instruct patient on fluid and nutrition as appropriate  Outcome: Completed  Goal: Fluid balance maintained  Description  INTERVENTIONS:  - Monitor labs and assess for signs and symptoms of volume excess or deficit  - Monitor I/O and WT  - Instruct patient on fluid and nutrition as appropriate  Outcome: Completed  Goal: Glucose maintained within target range  Description  INTERVENTIONS:  - Monitor Blood Glucose as ordered  - Assess for signs and symptoms of hyperglycemia and hypoglycemia  - Administer ordered medications to maintain glucose within target range  - Assess nutritional intake and initiate nutrition service referral as needed  Outcome: Completed     Problem: SKIN/TISSUE INTEGRITY - ADULT  Goal: Skin integrity remains intact  Description  INTERVENTIONS  - Identify patients at risk for skin breakdown  - Assess and monitor skin integrity  - Assess and monitor nutrition and hydration status  - Monitor labs (i e  albumin)  - Assess for incontinence   - Turn and reposition patient  - Assist with mobility/ambulation  - Relieve pressure over bony prominences  - Avoid friction and shearing  - Provide appropriate hygiene as needed including keeping skin clean and dry  - Evaluate need for skin moisturizer/barrier cream  - Collaborate with interdisciplinary team (i e  Nutrition, Rehabilitation, etc )   - Patient/family teaching  Outcome: Completed  Goal: Oral mucous membranes remain intact  Description  INTERVENTIONS  - Assess oral mucosa and hygiene practices  - Implement preventative oral hygiene regimen  - Implement oral medicated treatments as ordered  - Initiate Nutrition services referral as needed  Outcome: Completed     Problem: MUSCULOSKELETAL - ADULT  Goal: Maintain or return mobility to safest level of function  Description  INTERVENTIONS:  - Assess patient's ability to carry out ADLs; assess patient's baseline for ADL function and identify physical deficits which impact ability to perform ADLs (bathing, care of mouth/teeth, toileting, grooming, dressing, etc )  - Assess/evaluate cause of self-care deficits   - Assess range of motion  - Assess patient's mobility; develop plan if impaired  - Assess patient's need for assistive devices and provide as appropriate  - Encourage maximum independence but intervene and supervise when necessary  - Involve family in performance of ADLs  - Assess for home care needs following discharge   - Request OT consult to assist with ADL evaluation and planning for discharge  - Provide patient education as appropriate  Outcome: Completed  Goal: Maintain proper alignment of affected body part  Description  INTERVENTIONS:  - Support, maintain and protect limb and body alignment  - Provide pt/fam with appropriate education  Outcome: Completed     Problem: INFECTION - ADULT  Goal: Absence or prevention of progression during hospitalization  Description  INTERVENTIONS:  - Assess and monitor for signs and symptoms of infection  - Monitor lab/diagnostic results  - Monitor all insertion sites, i e  indwelling lines, tubes, and drains  - Monitor endotracheal (as able) and nasal secretions for changes in amount and color  - [place] appropriate cooling/warming therapies per order  - Administer medications as ordered  - Instruct and encourage patient and family to use good hand hygiene technique  - Identify and instruct in appropriate isolation precautions for identified infection/condition  Outcome: Completed  Goal: Absence of fever/infection during neutropenic period  Description  INTERVENTIONS:  - Monitor WBC  - Implement neutropenic guidelines  Outcome: Completed     Problem: SAFETY ADULT  Goal: Maintain or return to baseline ADL function  Description  INTERVENTIONS:  -  Assess patient's ability to carry out ADLs; assess patient's baseline for ADL function and identify physical deficits which impact ability to perform ADLs (bathing, care of mouth/teeth, toileting, grooming, dressing, etc )  - Assess/evaluate cause of self-care deficits   - Assess range of motion  - Assess patient's mobility; develop plan if impaired  - Assess patient's need for assistive devices and provide as appropriate  - Encourage maximum independence but intervene and supervise when necessary  ¯ Involve family in performance of ADLs  ¯ Assess for home care needs following discharge   ¯ Request OT consult to assist with ADL evaluation and planning for discharge  ¯ Provide patient education as appropriate  Outcome: Completed  Goal: Maintain or return mobility status to optimal level  Description  INTERVENTIONS:  - Assess patient's baseline mobility status (ambulation, transfers, stairs, etc )    - Identify cognitive and physical deficits and behaviors that affect mobility  - Identify mobility aids required to assist with transfers and/or ambulation (gait belt, sit-to-stand, lift, walker, cane, etc )  - [place] fall precautions as indicated by assessment  - Record patient progress and toleration of activity level on Mobility SBAR; progress patient to next Phase/Stage  - Instruct patient to call for assistance with activity based on assessment  - Request Rehabilitation consult to assist with strengthening/weightbearing, etc   Outcome: Completed     Problem: Knowledge Deficit  Goal: Patient/family/caregiver demonstrates understanding of disease process, treatment plan, medications, and discharge instructions  Description  Complete learning assessment and assess knowledge base    Interventions:  - Provide teaching at level of understanding  - Provide teaching via preferred learning methods  Outcome: Completed

## 2019-07-18 NOTE — CONSULTS
Vancomycin therapy has been discontinued  Thank you for the consult   Pharmacy will sign off    Martha Dear, Pharmacist

## 2019-07-18 NOTE — DEATH NOTE
INPATIENT DEATH NOTE  Jerrica Price 68 y o  female MRN: [id number]  Unit/Bed#: ICU 6 Encounter: [id number]    Date, Time and Cause of Death    Date of Death:  19  Time of Death:   2:15 PM  Preliminary Cause of Death:  Cardiac arrest due to underlying cardiac condition,  likely atherosclerotic cardiovascular disease  Entered by:  PHILLY Andujar[EK1 1]     Attribution     EK1 1 PHILLY Ortiz 19 16:20           Patient's Information  Pronounced by: PHILLY Ortiz  Did the patient's death occur in the ED?: No  Did the patient's death occur in the OR?: No  Did the patient's death occur less than 10 days post-op?: No  Did the patient's death occur within 24 hours of admission?: Yes  Was code status DNR at the time of death?: Yes    PHYSICAL EXAM:  Unresponsive to noxious stimuli, Spontaneous respirations absent, Breath sounds absent, Carotid pulse absent, Heart sounds absent, Pupillary light reflex absent and Corneal blink reflex absent    Medical Examiner notification criteria:  NONE APPLICABLE   Medical Examiner's office notified?:  No   Medical Examiner accepted case?:  No  Name of Medical Examiner: NA         Autopsy Options:  Post-mortem examination declined by next of kin    Primary Service Attending Physician notified?:  yes - Attending:  Holly Reese MD    Physician/Resident responsible for completing Discharge Summary:  [address]

## 2019-07-18 NOTE — ASSESSMENT & PLAN NOTE
· Lactic acid 12 9 on admission, likely secondary to cardiac arrest  · Down trended to 5 6, continue to trend

## 2019-07-18 NOTE — DISCHARGE SUMMARY
Discharge Summary - Betty Seen 68 y o  female MRN: [id number]    Unit/Bed#: ICU 11 Encounter: [id number] PCP: Callie Lesches, MD    Admission Date:   Admission Orders (From admission, onward)    Ordered        07/18/19 0133  Inpatient Admission  Once               Admitting Diagnosis: Cardiac arrest ([facility] [address] ) [I46 9]  Congestive heart failure (CHF) ([facility] [address] ) [I50 9]    HPI:     From Admission    Betty Seen is a 68 y o  female who presents s/p PEA arrest  Past medical history of systolic heart failure, pacemaker, diabetes, COPD, CAD s/p MAURIZIO to LAD in 2015, and HTN  Per EMS, patient called 911 secondary to shortness of breath at approximately 1130 on 7/17  EMS arrived and found patient in PEA  CPR was initiated in the field where she was coded for approximately 45 minutes and received 6 rounds of Epinephrine  She was intubated pre-hospital during arrest  On arrival, ROSC was achieved and she was found to be in Afib with RVR  Patient had another episode of PEA with ROSC after 1 round of Epinephrine in the ED  Upon assessment, patient is unresponsive to stimuli with pupils that are fixed and dilated  CXR concerning for right sided pneumonia  Laboratory data significant for leukocytosis of 28k with 15% bandemia, creatinine 1 5, lactic acid of 12 9, and transaminitis  Initial ABG showed mixed respiratory and metabolic acidos: 8 8/71/933/09 2  EKG upon obtaining ROSC shows Aflutter with 2:1 block without acute ST changes changes  CT head compatible with global hypoxic ischemic injury  Patient was started on empiric antibiotics, given a total of 4amps of bicarb and transferred to the ICU for initiation of targeted temperature management, as well as further workup and monitoring  Multiple attempts were made to contact the family were unsuccessful  Multiple voicemails left to return call       Procedures Performed:   Orders Placed This Encounter   Procedures   [facility] [employer] ED ECG Documentation Only Summary of Hospital Course:     Pt was admitted to the ICU  Patient had 3rd episode of PEA arrest in the morning with return of ROSC  Patient's daughter and 1 of her sons was contacted and arrived at bedside  Patient's daughter was updated regarding patient's anoxic brain injury and poor prognosis  Patient's other son was contacted via telephone  A decision was made by both the son and daughter to proceed to comfort care  The family wanted other members of the family to arrive and the daughter agreed to level 2 DNR status in the meantime  One family arrived they decided on comfort care measures, which were subsequently initiated  2 mg of IV Ativan were given for suspected seizures  And supplemental bicarb been 8 was also given for acidosis  Patient was pronounced dead at 2:15 p m  On 07/18/2019  The preliminary cause of death was cardiac arrest due to underlying cardiac condition likely at her a sclerotic cardiovascular disease  Significant Findings, Care, Treatment and Services Provided:    Xr Chest Portable    Result Date: 7/18/2019  Impression: 1  Satisfactory position of the endotracheal tube  2   Bilateral alveolar densities which may represent asymmetric heart failure versus multifocal pneumonia  Workstation performed: [id number]     Xr Chest 1 View Portable    Result Date: 7/18/2019  Impression: 1  Endotracheal tube lies in the proximal right mainstem bronchus this is recognized by the emergency room physician who states she will withdraw the tube 2 cm  2   Bilateral alveolar densities likely representing heart failure, less likely multifocal pneumonia  Workstation performed: [id number]     Ct Head Without Contrast    Result Date: 7/18/2019  Impression: Hypodensity of the lentiform and caudate nuclei bilaterally compatible with global hypoxic ischemic injury    I personally discussed this study with [facility] ICU on 7/18/2019 at 3:16 AM  Workstation performed: [id number]     Results for Umu Hawthorne (MRN [id number]) as of 7/18/2019 16:47   Ref  Range 7/18/2019 01:20 7/18/2019 04:50 7/18/2019 07:26 7/18/2019 08:17   Troponin I Latest Ref Range: <=0 04 ng/mL 0 08 (H) 2 53 (H) 3 56 (H) 3 91 (H)   NT-proBNP Latest Ref Range: <125 pg/mL 1,578 (H)          Results for Umu Hawthorne (MRN [id number]) as of 7/18/2019 16:47   Ref  Range 7/18/2019 01:20 7/18/2019 04:51 7/18/2019 07:26 7/18/2019 08:17   LACTIC ACID Latest Ref Range: 0 5 - 2 0 mmol/L 12 9 (HH) 5 6 (HH) 6 3 (HH) 8 7 (HH)        Ref  Range 7/18/2019 01:20 7/18/2019 04:51 7/18/2019 08:17   WBC Latest Ref Range: 4 31 - 10 16 Thousand/uL 28 93 (H) 19 39 (H) 22 27 (H)        Ref   Range 7/18/2019 01:07 7/18/2019 03:18 7/18/2019 04:52 7/18/2019 08:18   SANDHYA TEST Unknown Yes Yes Yes Yes   Temperature Latest Units: Degrees Fehrenheit 97 6 97 6 99 0 94 0   pH, Arterial Latest Ref Range: 7 350 - 7 450  6 814 (LL) 7 201 (LL) 7 365 7 238 (LL)   PH ART TC Latest Ref Range: 7 350 - 7 450  6 821 (LL) 7 209 (LL) 7 362 7 273 (L)   PCO2 (TC) Arterial Latest Ref Range: 36 0 - 44 0 mm Hg 66 5 (HH) 65 4 (HH) 36 0 43 8   pCO2, Arterial Latest Ref Range: 36 0 - 44 0 mm Hg 68 3 (HH) 67 1 (HH) 35 7 (L) 49 1 (H)   pO2, Arterial Latest Ref Range: 75 0 - 129 0 mm Hg 144 9 (H) 94 4 232 8 (H) 62 9 (L)   HCO3, Arterial Latest Ref Range: 22 0 - 28 0 mmol/L 10 7 (L) 25 7 19 9 (L) 20 5 (L)   Base Excess, Arterial Latest Units: mmol/L -24 1 -4 1 -4 6 -7 1   O2 Content, Arterial Latest Ref Range: 16 0 - 23 0 mL/dL 18 0 21 8 22 3 18 2   O2 HGB,Arterial Latest Ref Range: 94 0 - 97 0 % 96 3 95 7 98 8 (H) 88 8 (L)   PO2 at Temp Latest Ref Range: 75 0 - 129 0 mm Hg 141 3 (H) 91 0 233 8 (H) 52 6 (LL)   ABG SOURCE Unknown Radial, Left Radial, Right Line, Arterial Line, Arterial   AC Rate Unknown 16 24 28 28   Tidal Volume Latest Units: ml 450 450 450 450   Inspired Air (FIO2) Unknown 100 100 100 100   PEEP Unknown 5 8 8 8 0   Vent Type- AC Unknown AC AC AC AC Arterial waveform was found to be absent  Patient noted to be on PEA  Code Blue called  Patient resuscitated  Manual ventilation performed using ambu bag with PEEP  Patient placed on ventilator after resuscitation  Extubation completed with family at bedside  Patient pronounced dead at 2:15 pm on 19  Complications: NA    Disposition:      Final Diagnosis: cardiac arrest due to underlying cardiac condition likely at her a sclerotic cardiovascular disease      Resolved Problems  Date Reviewed: 2019    None          Condition at Time of Death:     Date, Time and Cause of Death    Preliminary Cause of Death:  Cardiac arrest due to underlying cardiac condition  Entered by:  PHILLY Paiz[EK1 1]     Attribution     EK1 1 PHILLY Stephenson 19 16:20

## 2019-07-18 NOTE — ASSESSMENT & PLAN NOTE
· Upon achieving ROSC, patient in [address] with 2:1 block  · HR 140s with SBP 180s  · Given 1 dose of IV lopressor with improvement in HR to 110s  · Repeat EKG shows sinus tachycardia

## 2019-07-18 NOTE — ASSESSMENT & PLAN NOTE
· Patient found PEA in the field after she called EMS related to shortness of breath  · Likely precipitated by hypoxia but cannot rule out cardiac event   · Received CPR for 45 minutes with 6 rounds of Epinephrine and intubated in the field  · Upon arrival to ER, ROSC achieved   Patient had one other episode of PEA and achieved ROSC after 1 round of epi  · After ROSC, patient in [address] with rates 140, no acute ST changes  · Pupils fixed and dilated post arrest, no spontaneous movement  · TTM initiated at 36 degrees celsius   · CT head showed evidence of anoxic brain injury  · Unable to reach family at this time, continue full medical treatment

## 2019-07-18 NOTE — ASSESSMENT & PLAN NOTE
· Intubated prehospital during arrest  · Initial ABG showed mixed metabolic and respiratory acidosis  · Vent settings adjusted: AC 28 x 450 70% 8PEEP  · Wean Fio2, PEEP for goal spo2 >94%  · Acute respiratory failure likely secondary to right sided pneumonia  · Continue empiric coverage for now with cefepime, vanco, will add flagyl for aspiration coverage  · Pulmonary hygiene  · Sputum cx pending

## 2019-07-18 NOTE — ASSESSMENT & PLAN NOTE
· Patient hyperglycemic, likely secondary to epinphrine administration and stress response  · Does have baseline diabetes  · Will initiate insulin gtt for improved control  · Negative ketones in urine  · Beta hydroxybutyrate 0 2  · Goal SBP <180

## 2019-07-18 NOTE — UTILIZATION REVIEW
Initial Clinical Review    Admission: Date/Time/Statement: 7/18/19 @ 0133     Orders Placed This Encounter   Procedures    Inpatient Admission     Standing Status:   Standing     Number of Occurrences:   1     Order Specific Question:   Admitting Physician     Answer:   James Parks [[id number]]     Order Specific Question:   Level of Care     Answer:   Critical Care [15]     Order Specific Question:   Estimated length of stay     Answer:   More than 2 Midnights     Order Specific Question:   Certification     Answer:   I certify that inpatient services are medically necessary for this patient for a duration of greater than two midnights  See H&P and MD Progress Notes for additional information about the patient's course of treatment  ED Arrival Information     Expected Arrival Acuity Means of Arrival Escorted By Service Admission Type    - 7/18/2019 00:48 Immediate Ambulance [address] Emergency    Arrival Complaint    -        Chief Complaint   Patient presents with    Cardiac Arrest     pt woke up SOB called 911 herself approximately 2330  Medics arrived and found pt in PEA, acheived ROSC at [address]  At that point pt was in Afib  Lost pulses again at [address] [facility] and pt was in PEA, arrived here at 0048 in Afib  Given 6 epis in the field, BS was 296     Assessment/Plan:   69 YO FEMALE TO ER VIA EMS  PT CALLED EMS C/O SOB & NOT FEELING WELL  EMS FOUND PT UNRESPONSIVE WITH INITIAL RHYTHM PEA/ASYSTOLE; APPROX DOWNTIME 20 MIN  CPR, INTBATED & RECEIVED 6 AMPS EPI PTA  UPON ARRIVAL RHYTHM AFIB, STILL UNRESPONSIVE WITH FIXED & DILATED PUPILS  ADMITTED TO INPATIENT STATUS S/P CARDIAC ARREST, INTUBATED ON EPI & HEPARIN GTTS, IVF, IV ELECTROLYTE REPLETION & STARTED ON IVABT THERAPY     ED Triage Vitals   Temperature Pulse Respirations Blood Pressure SpO2   07/18/19 0105 07/18/19 0105 07/18/19 0105 07/18/19 0105 07/18/19 0100   97 6 °F (36 4 °C) (!) 161 14 (!) 193/98 92 %      Temp Source Heart Rate Source Patient Position - Orthostatic VS BP Location FiO2 (%)   07/18/19 0105 07/18/19 0105 07/18/19 0105 07/18/19 0105 07/18/19 0310   Tympanic Monitor Lying Left arm 100      Pain Score       --               Wt Readings from Last 1 Encounters:   07/18/19 86 7 kg (191 lb 2 2 oz)     Additional Vital Signs:   07/18/19 0310    145Abnormal   31Abnormal   191/101Abnormal   140      92 %  Other (comment)      O2 Device: via vent at 07/18/19 0310   07/18/19 0245    142Abnormal   24Abnormal   177/107Abnormal         96 %       07/18/19 0215    126Abnormal   32Abnormal   170/121Abnormal         98 %       07/18/19 0200    122Abnormal   27Abnormal   166/91        98 %       07/18/19 0145    116Abnormal   23Abnormal   143/85        94 %       07/18/19 0133                91 %       07/18/19 0105  97 6 °F (36 4 °C)  161Abnormal   14  193/98Abnormal         94 %  Other (comment)   Lying   O2 Device: size 7 ETT, bagged at 100% at 07/18/19 0105   Pertinent Labs/Diagnostic Test Results:   Results from last 7 days   Lab Units 07/18/19  0818 07/18/19  0817 07/18/19  0451 07/18/19  0120   WBC Thousand/uL  --  22 27* 19 39* 28 93*   HEMOGLOBIN g/dL  --  13 9 15 0 11 1*   I STAT HEMOGLOBIN g/dl 13 6  --   --   --    HEMATOCRIT %  --  42 5 45 2 37 0   HEMATOCRIT, ISTAT % 40  --   --   --    PLATELETS Thousands/uL  --  231 285 203   NEUTROS ABS Thousands/µL  --  17 02* 15 38*  --    BANDS PCT %  --   --   --  15*     Results from last 7 days   Lab Units 07/18/19  0818 07/18/19  0817 07/18/19  0451 07/18/19  0120   SODIUM mmol/L  --  148* 141 141   POTASSIUM mmol/L  --  2 9* 4 2 4 2   CHLORIDE mmol/L  --  109* 104 106   CO2 mmol/L  --  24 22 18*   CO2, I-STAT mmol/L 23  --   --   --    ANION GAP mmol/L  --  15* 15* 17*   BUN mg/dL  --  21 22 17   CREATININE mg/dL  --  1 56* 1 58* 1 57*   EGFR ml/min/1 73sq m  --  33 32 32   CALCIUM mg/dL  --  9 4 7 9* 8 0*   CALCIUM, IONIZED, ISTAT mmol/L 1 46*  --   --   -- MAGNESIUM mg/dL  --  1 7 2 0  --    PHOSPHORUS mg/dL  --  2 7  --   --      Results from last 7 days   Lab Units 07/18/19  0817 07/18/19  0120   AST U/L 333* 231*   ALT U/L 181* 174*   ALK PHOS U/L 180* 180*   TOTAL PROTEIN g/dL 5 2* 5 0*   ALBUMIN g/dL 2 2* 2 2*   TOTAL BILIRUBIN mg/dL 0 50 0 20     Results from last 7 days   Lab Units 07/18/19  0646 07/18/19  0449   POC GLUCOSE mg/dl 413* 455*     Results from last 7 days   Lab Units 07/18/19  0817 07/18/19  0451 07/18/19  0120   GLUCOSE RANDOM mg/dL 403* 506* 419*     Results from last 7 days   Lab Units 07/18/19  0818 07/18/19  0452 07/18/19  0318   PH ART  7 238* 7 365 7 201*   PCO2 ART mm Hg 49 1* 35 7* 67 1*   PO2 ART mm Hg 62 9* 232 8* 94 4   HCO3 ART mmol/L 20 5* 19 9* 25 7   BASE EXC ART mmol/L -7 1 -4 6 -4 1   O2 CONTENT ART mL/dL 18 2 22 3 21 8   O2 HGB, ARTERIAL % 88 8* 98 8* 95 7   ABG SOURCE  Line, Arterial Line, Arterial Radial, Right     Results from last 7 days   Lab Units 07/18/19  0818   I STAT BASE EXC mmol/L -6*   I STAT O2 SAT % 87*   ISTAT PH ART  7 239*   I STAT ART PCO2 mm HG 50 6*   I STAT ART PO2 mm HG 63 0*   I STAT ART HCO3 mmol/L 21 7*     Results from last 7 days   Lab Units 07/18/19  0817 07/18/19  0726 07/18/19  0450 07/18/19  0120   TROPONIN I ng/mL 3 91* 3 56* 2 53* 0 08*     Results from last 7 days   Lab Units 07/18/19  0647   PROTIME seconds 12 7*   INR  1 22*   PTT seconds 27     Results from last 7 days   Lab Units 07/18/19  0817 07/18/19  0726 07/18/19  0451 07/18/19  0120   LACTIC ACID mmol/L 8 7* 6 3* 5 6* 12 9*     Results from last 7 days   Lab Units 07/18/19  0120   NT-PRO BNP pg/mL 1,578*     Results from last 7 days   Lab Units 07/18/19  0228   CLARITY UA  Clear   COLOR UA  Yellow   SPEC GRAV UA  1 015   PH UA  7 5   GLUCOSE UA mg/dl >=1000 (1%)*   KETONES UA mg/dl Negative   BLOOD UA  Moderate*   PROTEIN UA mg/dl 100 (2+)*   NITRITE UA  Negative   BILIRUBIN UA  Negative   UROBILINOGEN UA E U /dl 0 2   LEUKOCYTES UA Elevated glucose may cause decreased leukocyte values  See urine microscopic for [facility] result/*   WBC UA /hpf 2-4*   RBC UA /hpf 4-10*   BACTERIA UA /hpf Occasional   EPITHELIAL CELLS WET PREP /hpf Occasional     Results from last 7 days   Lab Units 07/18/19  0120   TOTAL COUNTED  100     CXR=1  Endotracheal tube lies in the proximal right mainstem bronchus this is recognized by the emergency room physician who states she will withdraw the tube 2 cm  2   Bilateral alveolar densities likely representing heart failure, less likely multifocal pneumonia  CT HEAD=Hypodensity of the lentiform and caudate nuclei bilaterally compatible with global hypoxic ischemic injury  CXR=1  Satisfactory position of the endotracheal tube    2   Bilateral alveolar densities which may represent asymmetric heart failure versus multifocal pneumonia  EKG=Interpretation: abnormal      ECG rate:  163bpm    ECG rate assessment: tachycardic      Rhythm: atrial fibrillation    ED Treatment:   Medication Administration from 07/18/2019 0048 to 07/18/2019 0303       Date/Time Order Dose Route Action Action by Comments     07/18/2019 0249 EPINEPHrine (ADRENALIN) 1 mg/mL injection **ADS Override Pull**    Not Given Shakir Light RN      07/18/2019 0250 EPINEPHrine 3000 mcg (STANDARD CONCENTRATION) IV in sodium chloride 0 9% 250 mL 1 mcg/min Intravenous Not Given Shakir Light RN      07/18/2019 0130 sodium bicarbonate 8 4 % injection 50 mEq 50 mEq Intravenous Given Shakir Light RN      07/18/2019 0215 cefepime (MAXIPIME) 2 g/50 mL dextrose IVPB 0 mg Intravenous Stopped Shakir Light RN      07/18/2019 0152 cefepime (MAXIPIME) 2 g/50 mL dextrose IVPB 2,000 mg Intravenous New [address] [place] [address] Shakir Light RN      07/18/2019 0145 sodium bicarbonate 8 4 % injection 50 mEq 50 mEq Intravenous Given Shakir Light RN      07/18/2019 0242 sodium bicarbonate 8 4 % injection 50 mEq 50 mEq Intravenous Given Shakir Light RN      07/18/2019 0242 sodium bicarbonate 8 4 % injection 50 mEq 50 mEq Intravenous Given Oral MIS Chandler      07/18/2019 0242 propofol (DIPRIVAN) 200 MG/20ML bolus injection 50 mg 50 mg Intravenous Given Oral MIS Chandler      07/18/2019 0243 propofol (DIPRIVAN) 1000 mg in 100 mL infusion (premix) 10 mcg/kg/min Intravenous New Bag Oral MIS Chandler         Past Medical History:   Diagnosis Date    Cardiac disease      Present on Admission:   PEA (Pulseless electrical activity) ([facility] [address] )   Acute respiratory failure with hypoxia and hypercapnia (HCC)   Hypoxic brain injury (HCC)   Lactic acid acidosis   Severe sepsis (HCC)   High anion gap metabolic acidosis   Systolic heart failure (HCC)   Hyperglycemia   Atrial flutter (HCC)   Elevated troponin  Admitting Diagnosis: Cardiac arrest ([facility] [address] ) [I46 9]  Congestive heart failure (CHF) (HCC) [I50 9]  Age/Sex: 68 y o  female  Admission Orders:  ICU  VENTED  FALL PRECAUTIONS  FLOWERS CATH  NEURO CHECKS Q4H  [address]  NPO/OGT  LABS PER IV WEIGHT BASED HEPARIN GTT PROTOCOL  Current Facility-Administered Medications:  aspirin 81 mg Oral Daily   atorvastatin 80 mg Oral Daily   cefepime 1,000 mg Intravenous Q12H   chlorhexidine 15 mL Swish & Spit Q12H [facility] & half-way   EPINEPHrine      epinephrine 1-10 mcg/min Intravenous Titrated   heparin (porcine) 3-20 Units/kg/hr (Order-Specific) Intravenous Titrated   heparin (porcine) 2,000 Units Intravenous PRN   heparin (porcine) 4,000 Units Intravenous PRN   insulin regular (HumuLIN R,NovoLIN R) infusion 0 3-21 Units/hr Intravenous Titrated   levalbuterol 0 63 mg Nebulization Q8H PRN   LORazepam 2 mg Intravenous Q4H PRN   magnesium sulfate 2 g Intravenous Once   metroNIDAZOLE 500 mg Intravenous Q8H   multi-electrolyte 75 mL/hr Intravenous Continuous   potassium chloride 20 mEq Intravenous Once   Followed by      potassium chloride 20 mEq Intravenous Once   [START ON 7/19/2019] vancomycin 15 mg/kg (Adjusted) Intravenous Q24H     Network Utilization Review Department  Phone: [phone]; Fax [phone]  [email or web address]  ATTENTION: Please call with any questions or concerns to [phone]  and carefully listen to the prompts so that you are directed to the right person  Send all requests for admission clinical reviews, approved or denied determinations and any other requests to fax [phone]   All voicemails are confidential

## 2019-07-18 NOTE — ED PROVIDER NOTES
History  Chief Complaint   Patient presents with    Cardiac Arrest     pt woke up SOB called 911 herself approximately 2330  Medics arrived and found pt in PEA, acheived ROSC at [address]  At that point pt was in Afib  Lost pulses again at [facility] and pt was in PEA, arrived here at 0048 in Afib  Given 6 epis in the field, BS was 296     Pt in ER with medics after a cardiac arrest  Per medics, pt called, stating that she was SOB and felt poorly  EMS arrived and found pt unresponsive  Per medics, initial rhythm was PEA/Asystole  Downtime of approx 20mins prior to initiation of CPR  Pt intubated en route and received a total of 6 rounds of Epi  On arrival, pt has ROSC, is in Afib with RVR and still unresponsive, with fixed and dilated pupils  Pt lost pulses once in the ER, and received 1 amp of Epi  Prior to Admission Medications   Prescriptions Last Dose Informant Patient Reported? Taking?    albuterol (ACCUNEB) 1 25 MG/3ML nebulizer solution 2019  Yes Yes   Sig: Take 1 ampule by nebulization every 6 (six) hours as needed for wheezing   albuterol (PROVENTIL HFA,VENTOLIN HFA) 90 mcg/act inhaler 2019  Yes Yes   Sig: Inhale 2 puffs every 6 (six) hours as needed for wheezing   aspirin 81 mg chewable tablet 2019  Yes Yes   Sig: Chew 81 mg daily   atorvastatin (LIPITOR) 80 mg tablet 2019  Yes Yes   Sig: Take 80 mg by mouth daily   carvedilol (COREG) 12 5 mg tablet 2019  Yes Yes   Sig: Take 12 5 mg by mouth 2 (two) times a day with meals   fluticasone (FLONASE) 50 mcg/act nasal spray 2019  Yes Yes   Si spray into each nostril as needed for rhinitis   sacubitril-valsartan (ENTRESTO) 24-26 MG TABS 2019  Yes Yes   Sig: Take 1 tablet by mouth 2 (two) times a day      Facility-Administered Medications: None       Past Medical History:   Diagnosis Date    Cardiac disease        Past Surgical History:   Procedure Laterality Date    CARDIAC PACEMAKER PLACEMENT      CARDIAC SURGERY History reviewed  No pertinent family history  I have reviewed and agree with the history as documented  Social History     Tobacco Use    Smoking status: Never Smoker    Smokeless tobacco: Never Used   Substance Use Topics    Alcohol use: Never     Frequency: Never     Drinks per session: Patient refused     Binge frequency: Never    Drug use: No        Review of Systems   Unable to perform ROS: Unstable vital signs       Physical Exam  Physical Exam   Constitutional: She appears well-developed and well-nourished  HENT:   Head: Normocephalic and atraumatic  Eyes: Right pupil is round  Left pupil is round  Pupils are equal    Pupils are unreactive   Cardiovascular: An irregularly irregular rhythm present  Tachycardia present  Pulmonary/Chest: She has rhonchi in the right upper field, the right middle field, the right lower field, the left upper field, the left middle field and the left lower field  intubated   Abdominal: Soft  Bowel sounds are normal    Neurological: She is unresponsive  GCS eye subscore is 1  GCS verbal subscore is 1  GCS motor subscore is 1  Nursing note and vitals reviewed        Vital Signs  ED Triage Vitals   Temperature Pulse Respirations Blood Pressure SpO2   07/18/19 0105 07/18/19 0105 07/18/19 0105 07/18/19 0105 07/18/19 0100   97 6 °F (36 4 °C) (!) 161 14 (!) 193/98 92 %      Temp Source Heart Rate Source Patient Position - Orthostatic VS BP Location FiO2 (%)   07/18/19 0105 07/18/19 0105 07/18/19 0105 07/18/19 0105 07/18/19 0310   Tympanic Monitor Lying Left arm 100      Pain Score       --                  Vitals:    07/18/19 0600 07/18/19 0615 07/18/19 0630 07/18/19 0645   BP: 140/85      Pulse: (!) 112 (!) 111 (!) 111 104   Patient Position - Orthostatic VS:             Visual Acuity  Visual Acuity      Most Recent Value   L Pupil Size (mm)  3   R Pupil Size (mm)  3   L Pupil Shape  Round   R Pupil Shape  Round          ED Medications  Medications   EPINEPHrine (ADRENALIN) 1 mg/mL injection **ADS Override Pull** (  Not Given 7/18/19 0249)   propofol (DIPRIVAN) 1000 mg in 100 mL infusion (premix) (25 mcg/kg/min × 95 3 kg Intravenous Rate/Dose Change 7/18/19 0435)   levalbuterol (XOPENEX) inhalation solution 0 63 mg (has no administration in time range)   aspirin chewable tablet 81 mg (has no administration in time range)   atorvastatin (LIPITOR) tablet 80 mg (has no administration in time range)   chlorhexidine (PERIDEX) 0 12 % oral rinse 15 mL (has no administration in time range)   cefepime (MAXIPIME) IVPB (premix) 1,000 mg (has no administration in time range)   vancomycin (VANCOCIN) IVPB (premix) 1,000 mg (has no administration in time range)   multi-electrolyte (ISOLYTE-S PH 7 4 equivalent) IV solution (75 mL/hr Intravenous New Bag 7/18/19 0435)   insulin regular (HumuLIN R,NovoLIN R) 1 Units/mL in sodium chloride 0 9 % 100 mL infusion (9 Units/hr Intravenous Rate/Dose Change 7/18/19 0646)   metroNIDAZOLE (FLAGYL) IVPB (premix) 500 mg (500 mg Intravenous New Bag 7/18/19 0617)   heparin (porcine) 25,000 units in 250 mL infusion (premix) (has no administration in time range)   heparin (porcine) injection 4,000 Units (has no administration in time range)   heparin (porcine) injection 2,000 Units (has no administration in time range)   EPINEPHrine (ADRENALIN) injection (1 mg Intravenous Given 7/18/19 0800)   sodium bicarbonate 8 4 % injection 50 mEq (50 mEq Intravenous Given 7/18/19 0130)   cefepime (MAXIPIME) 2 g/50 mL dextrose IVPB (0 mg Intravenous Stopped 7/18/19 0215)   sodium bicarbonate 8 4 % injection 50 mEq (50 mEq Intravenous Given 7/18/19 0145)   sodium bicarbonate 8 4 % injection 50 mEq (50 mEq Intravenous Given 7/18/19 0242)   sodium bicarbonate 8 4 % injection 50 mEq (50 mEq Intravenous Given 7/18/19 0242)   propofol (DIPRIVAN) 200 MG/20ML bolus injection 50 mg (50 mg Intravenous Given 7/18/19 0242)   LORazepam (ATIVAN) 2 mg/mL injection 1 mg (1 mg Intravenous Given 7/18/19 0354)   vancomycin (VANCOCIN) 1,500 mg in sodium chloride 0 9 % 250 mL IVPB (0 mg Intravenous Stopped 7/18/19 0601)   metoprolol (LOPRESSOR) injection 5 mg (5 mg Intravenous Given 7/18/19 0437)   multi-electrolyte (ISOLYTE-S PH 7 4) bolus 500 mL (500 mL Intravenous New Bag 7/18/19 0618)       Diagnostic Studies  Results Reviewed     Procedure Component Value Units Date/Time    Neuron specific Enolase [[id number]] Collected:  07/18/19 0229    Lab Status:   In process Specimen:  Blood from Arm, Right Updated:  07/18/19 0506    Blood gas, arterial [[id number]]  (Abnormal) Collected:  07/18/19 0318    Lab Status:  Final result Specimen:  Blood, Arterial from Radial, Right Updated:  07/18/19 0325     pH, Arterial 7 201     PH ART TC 7 209     pCO2, Arterial 67 1 mm Hg      PCO2 (TC) Arterial 65 4 mm Hg      pO2, Arterial 94 4 mm Hg      PO2 (TC) Arterial 91 0 mm Hg      HCO3, Arterial 25 7 mmol/L      Base Excess, Arterial -4 1 mmol/L      O2 Content, Arterial 21 8 mL/dL      O2 HGB,Arterial  95 7 %      SOURCE Radial, Right     SANDHYA TEST Yes     Temperature 97 6 Degrees Fehrenheit      Vent Type- AC AC     AC Rate 24     Tidal Volume 450 ml      Inspired Air (FIO2) 100     PEEP 8    Urine Microscopic [[id number]]  (Abnormal) Collected:  07/18/19 0228    Lab Status:  Final result Specimen:  Urine, Straight Cath Updated:  07/18/19 0303     RBC, UA 4-10 /hpf      WBC, UA 2-4 /hpf      Epithelial Cells Occasional /hpf      Bacteria, UA Occasional /hpf      Fine granular casts 0-1 /lpf     Beta Hydroxybutyrate [[id number]]  (Normal) Collected:  07/18/19 0232    Lab Status:  Final result Specimen:  Blood from Arm, Right Updated:  07/18/19 0256     BETA-HYDROXYBUTYRATE 0 2 mmol/L     UA w Reflex to Microscopic w Reflex to Culture [[id number]]  (Abnormal) Collected:  07/18/19 0228    Lab Status:  Final result Specimen:  Urine, Straight Cath Updated:  07/18/19 0247     Color, UA Yellow     Clarity, UA Clear Specific Gravity, UA 1 015     pH, UA 7 5     Leukocytes, UA Elevated glucose may cause decreased leukocyte values  See urine microscopic for [facility] result/     Nitrite, UA Negative     Protein,  (2+) mg/dl      Glucose, UA >=1000 (1%) mg/dl      Ketones, UA Negative mg/dl      Urobilinogen, UA 0 2 E U /dl      Bilirubin, UA Negative     Blood, UA Moderate    Blood culture #2 [[id number]] Collected:  07/18/19 0146    Lab Status: In process Specimen:  Blood from Hand, Left Updated:  07/18/19 0157    Blood culture #1 [[id number]] Collected:  07/18/19 0146    Lab Status: In process Specimen:  Blood from Arm, Right Updated:  07/18/19 0157    Lactic acid, plasma [[id number]]  (Abnormal) Collected:  07/18/19 0120    Lab Status:  Final result Specimen:  Blood from Arm, Right Updated:  07/18/19 0156     LACTIC ACID 12 9 mmol/L     Narrative:       Result may be elevated if tourniquet was used during collection  CBC and differential [[id number]]  (Abnormal) Collected:  07/18/19 0120    Lab Status:  Final result Specimen:  Blood from Arm, Right Updated:  07/18/19 0153     WBC 28 93 Thousand/uL      RBC 3 70 Million/uL      Hemoglobin 11 1 g/dL      Hematocrit 37 0 %       fL      MCH 30 0 pg      MCHC 30 0 g/dL      RDW 13 4 %      MPV 11 2 fL      Platelets 283 Thousands/uL      nRBC 0 /100 WBCs     Narrative: This is an appended report  These results have been appended to a previously verified report      B-type natriuretic peptide [[id number]]  (Abnormal) Collected:  07/18/19 0120    Lab Status:  Final result Specimen:  Blood from Arm, Right Updated:  07/18/19 0151     NT-proBNP 1,578 pg/mL     Troponin I [[id number]]  (Abnormal) Collected:  07/18/19 0120    Lab Status:  Final result Specimen:  Blood from Arm, Right Updated:  07/18/19 0149     Troponin I 0 08 ng/mL     Comprehensive metabolic panel [[id number]]  (Abnormal) Collected:  07/18/19 0120    Lab Status:  Final result Specimen:  Blood from Arm, Right Updated:  07/18/19 0144     Sodium 141 mmol/L      Potassium 4 2 mmol/L      Chloride 106 mmol/L      CO2 18 mmol/L      ANION GAP 17 mmol/L      BUN 17 mg/dL      Creatinine 1 57 mg/dL      Glucose 419 mg/dL      Calcium 8 0 mg/dL       U/L       U/L      Alkaline Phosphatase 180 U/L      Total Protein 5 0 g/dL      Albumin 2 2 g/dL      Total Bilirubin 0 20 mg/dL      eGFR 32 ml/min/1 73sq m     Narrative:       National Kidney Disease Foundation guidelines for Chronic Kidney Disease (CKD):     Stage 1 with normal or high GFR (GFR > 90 mL/min/1 73 square meters)    Stage 2 Mild CKD (GFR = 60-89 mL/min/1 73 square meters)    Stage 3A Moderate CKD (GFR = 45-59 mL/min/1 73 square meters)    Stage 3B Moderate CKD (GFR = 30-44 mL/min/1 73 square meters)    Stage 4 Severe CKD (GFR = 15-29 mL/min/1 73 square meters)    Stage 5 End Stage CKD (GFR <15 mL/min/1 73 square meters)  Note: GFR calculation is accurate only with a steady state creatinine    Blood gas, arterial [[id number]]  (Abnormal) Collected:  07/18/19 0107    Lab Status:  Final result Specimen:  Blood, Arterial from Radial, Left Updated:  07/18/19 0117     pH, Arterial 6 814     PH ART TC 6 821     pCO2, Arterial 68 3 mm Hg      PCO2 (TC) Arterial 66 5 mm Hg      pO2, Arterial 144 9 mm Hg      PO2 (TC) Arterial 141 3 mm Hg      HCO3, Arterial 10 7 mmol/L      Base Excess, Arterial -24 1 mmol/L      O2 Content, Arterial 18 0 mL/dL      O2 HGB,Arterial  96 3 %      SOURCE Radial, Left     SANDHYA TEST Yes     Temperature 97 6 Degrees Fehrenheit      Vent Type- AC AC     AC Rate 16     Tidal Volume 450 ml      Inspired Air (FIO2) 100     PEEP 5                 XR chest portable   Final Result by Vanessa Pickering MD (07/18 6210)      1  Satisfactory position of the endotracheal tube  2   Bilateral alveolar densities which may represent asymmetric heart failure versus multifocal pneumonia        Workstation performed: [id number] CT head without contrast   Final Result by Evans Alexandre MD (07/18 2202)      Hypodensity of the lentiform and caudate nuclei bilaterally compatible with global hypoxic ischemic injury  I personally discussed this study with [facility] ICU on 7/18/2019 at 3:16 AM                      Workstation performed: [id number]         XR chest 1 view portable   ED Interpretation by Franchester Boas, DO (07/18 0241)   ETT in right mainstem, will withdraw 2cm      Final Result by Jewel Brooke MD (07/18 1838)         1  Endotracheal tube lies in the proximal right mainstem bronchus this is recognized by the emergency room physician who states she will withdraw the tube 2 cm  2   Bilateral alveolar densities likely representing heart failure, less likely multifocal pneumonia        Workstation performed: [id number]                    Procedures  ECG 12 Lead Documentation Only  Date/Time: 7/18/2019 12:55 AM  Performed by: Franchester Boas, DO  Authorized by: Franchester Boas, DO     Indications / Diagnosis:  Cardiac arrest  ECG reviewed by me, the ED Provider: yes    Patient location:  Bedside  Previous ECG:     Previous ECG:  Unavailable    Comparison to cardiac monitor: Yes    Interpretation:     Interpretation: abnormal    Rate:     ECG rate:  163bpm    ECG rate assessment: tachycardic    Rhythm:     Rhythm: atrial fibrillation      CriticalCare Time  Performed by: Franchester Boas, DO  Authorized by: Franchester Boas, DO     Critical care provider statement:     Critical care time (minutes):  65    Critical care time was exclusive of:  Separately billable procedures and treating other patients and teaching time    Critical care was necessary to treat or prevent imminent or life-threatening deterioration of the following conditions:  Respiratory failure, cardiac failure and metabolic crisis    Critical care was time spent personally by me on the following activities:  Blood draw for specimens, obtaining history from patient or surrogate, development of treatment plan with patient or surrogate, discussions with consultants, evaluation of patient's response to treatment, examination of patient, ventilator management, review of old charts, re-evaluation of patient's condition, ordering and review of radiographic studies, ordering and review of laboratory studies, ordering and performing treatments and interventions and interpretation of cardiac output measurements    I assumed direction of critical care for this patient from another provider in my specialty: no             ED Course                               MDM  Number of Diagnoses or Management Options  Cardiac arrest [facility]):   Congestive heart failure (CHF) [facility]):   Diagnosis management comments: Left a voice message with daughterArianne       Amount and/or Complexity of Data Reviewed  Clinical lab tests: ordered and reviewed  Tests in the radiology section of CPT®: ordered and reviewed    Risk of Complications, Morbidity, and/or Mortality  Presenting problems: high  Diagnostic procedures: high  Management options: high        Disposition  Final diagnoses:   Cardiac arrest ([facility] )   Congestive heart failure (CHF) ([facility] )     Time reflects when diagnosis was documented in both MDM as applicable and the Disposition within this note     Time User Action Codes Description Comment    7/18/2019  1:33 AM Katherine KING Add [I46 9] Cardiac arrest ([facility] )     7/18/2019  1:33 AM Katherine Vogt [I50 9] Congest[facility]     7/18/2019  4:44 AM Kandace Prader Add [I46 9] PEA (Pulseless electrical activity) [facility])       ED Disposition     ED Disposition Condition Date/Time Comment    Admit Stable u Jul 18, 2019  1:33 AM Case was discussed with JOAQUIM Santana and the patient's admission status was agreed to be Admission Status: inpatient status to the service of Dr Brenda Morris           Follow-up Information    None         Current Discharge Medication List      CONTINUE these medications which have NOT CHANGED    Details   albuterol (ACCUNEB) 1 25 MG/3ML nebulizer solution Take 1 ampule by nebulization every 6 (six) hours as needed for wheezing      albuterol (PROVENTIL HFA,VENTOLIN HFA) 90 mcg/act inhaler Inhale 2 puffs every 6 (six) hours as needed for wheezing      aspirin 81 mg chewable tablet Chew 81 mg daily      atorvastatin (LIPITOR) 80 mg tablet Take 80 mg by mouth daily      carvedilol (COREG) 12 5 mg tablet Take 12 5 mg by mouth 2 (two) times a day with meals      fluticasone (FLONASE) 50 mcg/act nasal spray 1 spray into each nostril as needed for rhinitis      sacubitril-valsartan (ENTRESTO) 24-26 MG TABS Take 1 tablet by mouth 2 (two) times a day           No discharge procedures on file      ED Provider  Electronically Signed by           Tacho Craven DO  07/18/19 7762

## 2019-07-18 NOTE — PROCEDURES
Arterial Line Insertion  Date/Time: 7/18/2019 4:42 AM  Performed by: PHILLY Mclain  Authorized by: PHILLY Mclain     Patient location:  Bedside  Consent:     Consent obtained:  Emergent situation  Universal protocol:     Patient identity confirmed:  Arm band and hospital-assigned identification number  Indications:     Indications: hemodynamic monitoring, multiple ABGs, continuous blood pressure monitoring and frequent labs / infusion    Pre-procedure details:     Skin preparation:  Chlorhexidine    Preparation: Patient was prepped and draped in sterile fashion    Anesthesia (see MAR for exact dosages): Anesthesia method:  None  Procedure details:     Location / Tip of Catheter:  Radial    Laterality:  Left    Tyler's test performed: yes      Needle gauge:  20 G    Placement technique:  Percutaneous    Number of attempts:  3    Successful placement: yes      Transducer: waveform confirmed    Post-procedure details:     Post-procedure:  Biopatch applied, sutured and secured with tape    CMS:  Unable to assess    Patient tolerance of procedure:   Tolerated well, no immediate complications

## 2019-07-18 NOTE — RESPIRATORY THERAPY NOTE
RT Ventilator Management Note  Hugh Adler 68 y o  female MRN: [id number]  Unit/Bed#: ICU 11 Encounter: [id number]      Daily Screen     No data found in the last 10 encounters              Physical Exam:   Assessment Type: Assess only  General Appearance: Sedated  Respiratory Pattern: Assisted  Chest Assessment: Chest expansion symmetrical  Bilateral Breath Sounds: Unable to assess  O2 Device: vent

## 2019-07-18 NOTE — RESPIRATORY THERAPY NOTE
Code blue activated  manual ventilation performed using ambu bag with peep  Placed back pt on vent after resuscitation

## 2019-07-18 NOTE — ASSESSMENT & PLAN NOTE
Wt Readings from Last 3 Encounters:   07/18/19 86 7 kg (191 lb 2 2 oz)       · History of systolic heart failure   Patient follows with [facility]  · EF from [place] perfusion scan in 2017 showed EF of 10%  · Will obtain echo for review  · Extremities warm, hemodynamically stable  · Does not appear to be in cardiogenic shock at this time

## 2019-07-18 NOTE — RESPIRATORY THERAPY NOTE
Respiratory was called to ED for CPR in progress  ET tube was already in place  [facility] RT manually ventilated the patient with BVM and 100% O2  Patient was then placed on ventilator per documented settings

## 2019-07-19 LAB — MRSA NOSE QL CULT: NORMAL

## 2019-07-22 NOTE — UTILIZATION REVIEW
Auth:   [id number]    Notification of Discharge  This is a Notification of Discharge from our facility [address]  Please be advised that this patient has been discharge from our facility  Below you will find the admission and discharge date and time including the patients disposition  PRESENTATION DATE: 2019 12:59 AM  OBS ADMISSION DATE:  IP ADMISSION DATE: 19   DISCHARGE DATE: 2019  3:35 PM  DISPOSITION:     Admission Orders listed below:  Admission Orders (From admission, onward)    Ordered        19  Inpatient Admission  Once             Please contact the UR Department if additional information is required to close this patient's authorization/case  [address] [facility] Utilization Review Department  Phone: [phone]; Fax [phone]  [email or web address]  ATTENTION: Please call with any questions or concerns to [phone]  and carefully listen to the prompts so that you are directed to the right person  Send all requests for admission clinical reviews, approved or denied determinations and any other requests to fax [phone]   All voicemails are confidential

## 2019-07-23 LAB
BACTERIA BLD CULT: NORMAL
BACTERIA BLD CULT: NORMAL

## 2019-07-24 LAB — NSE SERPL IA-MCNC: 17.2 NG/ML (ref 0–12.5)
